# Patient Record
Sex: FEMALE | Race: WHITE | NOT HISPANIC OR LATINO | Employment: FULL TIME | URBAN - METROPOLITAN AREA
[De-identification: names, ages, dates, MRNs, and addresses within clinical notes are randomized per-mention and may not be internally consistent; named-entity substitution may affect disease eponyms.]

---

## 2019-02-20 ENCOUNTER — HOSPITAL ENCOUNTER (OUTPATIENT)
Dept: MAMMOGRAPHY | Facility: HOSPITAL | Age: 39
Discharge: HOME | End: 2019-02-20
Attending: OBSTETRICS & GYNECOLOGY | Admitting: OBSTETRICS & GYNECOLOGY

## 2019-12-05 ENCOUNTER — OFFICE VISIT (OUTPATIENT)
Dept: ORTHOPEDIC SURGERY | Facility: CLINIC | Age: 39
End: 2019-12-05

## 2019-12-05 VITALS
HEART RATE: 71 BPM | HEIGHT: 64 IN | WEIGHT: 114 LBS | DIASTOLIC BLOOD PRESSURE: 72 MMHG | BODY MASS INDEX: 19.46 KG/M2 | SYSTOLIC BLOOD PRESSURE: 107 MMHG

## 2019-12-05 DIAGNOSIS — M79.645 FINGER PAIN, LEFT: Primary | ICD-10-CM

## 2019-12-05 DIAGNOSIS — R22.32 SUBCUTANEOUS MASS OF FINGER OF LEFT HAND: ICD-10-CM

## 2019-12-05 PROBLEM — K21.9 GASTROESOPHAGEAL REFLUX DISEASE: Status: ACTIVE | Noted: 2019-12-05

## 2019-12-05 PROBLEM — J30.9 ALLERGIC RHINITIS: Status: ACTIVE | Noted: 2019-12-05

## 2019-12-05 PROCEDURE — 99243 OFF/OP CNSLTJ NEW/EST LOW 30: CPT | Performed by: ORTHOPAEDIC SURGERY

## 2019-12-05 NOTE — PROGRESS NOTES
"     Patient ID: Deja Ibarra is a 39 y.o. female.    Chief Complaint:    Chief Complaint   Patient presents with   • Consult     cyst on base of middle finger       HPI:  Deja is a 39-year-old female here with a couple months of a mass on the volar aspect of the left hand.  It is waxed and waned in size, she felt like it might of gone away for a while but recurred.  It is tender only when she hits it on an object.  No constitutional symptoms.  No limitation of digit range of motion.  Pain is dull and a 1/10.  Here in consultation from Dr. Silver  History reviewed. No pertinent past medical history.    Past Surgical History:   Procedure Laterality Date   • BREAST SURGERY      implants   • HYSTERECTOMY         Family History   Problem Relation Age of Onset   • Hypertension Father    • Malig Hypertension Father           Social History     Occupational History   • Not on file   Tobacco Use   • Smoking status: Never Smoker   Substance and Sexual Activity   • Alcohol use: Yes   • Drug use: Not on file   • Sexual activity: Not on file      Review of Systems   Cardiovascular: Negative for chest pain.   Musculoskeletal: Positive for arthralgias.       Objective:    /72   Pulse 71   Ht 162.6 cm (64\")   Wt 51.7 kg (114 lb)   BMI 19.57 kg/m²     Physical Examination:  She is a pleasant female in no distress. She is alert and oriented x3 and appears her stated age.  Left hand demonstrates a small approximately 5 mm somewhat firm nodule over the volar aspect of the long finger at the base of the proximal phalanx.  It is freely mobile and appears subcutaneous.  It does not move with finger flexion and extension.  She has full range of motion of the digit.  Sensory and motor exam are intact all distributions. 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  This point she would like to continue observation.  See me as needed          Disclaimer: Please note that areas of this note were completed with computer voice recognition software.  Quite often unanticipated grammatical, syntax, homophones, and other interpretive errors are inadvertently transcribed by the computer software. Please excuse any errors that have escaped final proofreading.

## 2020-02-24 ENCOUNTER — TRANSCRIBE ORDERS (OUTPATIENT)
Dept: ADMINISTRATIVE | Facility: HOSPITAL | Age: 40
End: 2020-02-24

## 2020-02-24 DIAGNOSIS — Z12.31 BREAST CANCER SCREENING BY MAMMOGRAM: Primary | ICD-10-CM

## 2020-02-26 ENCOUNTER — HOSPITAL ENCOUNTER (OUTPATIENT)
Dept: MAMMOGRAPHY | Facility: HOSPITAL | Age: 40
Discharge: HOME | End: 2020-02-26
Admitting: OBSTETRICS & GYNECOLOGY

## 2020-02-26 DIAGNOSIS — Z12.31 BREAST CANCER SCREENING BY MAMMOGRAM: ICD-10-CM

## 2020-02-26 PROCEDURE — 77067 SCR MAMMO BI INCL CAD: CPT

## 2020-02-26 PROCEDURE — 77063 BREAST TOMOSYNTHESIS BI: CPT

## 2020-12-28 ENCOUNTER — TELEMEDICINE (OUTPATIENT)
Dept: FAMILY MEDICINE CLINIC | Facility: CLINIC | Age: 40
End: 2020-12-28

## 2020-12-28 DIAGNOSIS — K21.9 GASTROESOPHAGEAL REFLUX DISEASE WITHOUT ESOPHAGITIS: Primary | ICD-10-CM

## 2020-12-28 PROCEDURE — 99213 OFFICE O/P EST LOW 20 MIN: CPT | Performed by: FAMILY MEDICINE

## 2020-12-28 RX ORDER — PANTOPRAZOLE SODIUM 40 MG/1
40 TABLET, DELAYED RELEASE ORAL DAILY
Qty: 90 TABLET | Refills: 1 | Status: SHIPPED | OUTPATIENT
Start: 2020-12-28 | End: 2021-03-19

## 2020-12-28 NOTE — PROGRESS NOTES
Jadon Ibarra is a 40 y.o. female.     Patient was seen today through synchronous audio/video technology during the COVID-19 Public Health Emergency. Verbal consent was obtained. The patient was located at home. Vitals signs were not obtained due to lack of home monitoring access. Time spent with patient was 10 minutes.  Patient complains of intermittent heartburn that has been going on for several months not associated with any particular activity or dietary changes.  She has tried Pepcid, antacids, and Tums without improvement.  She did take a family members pantoprazole for 2 to 3 days with significant improvement in symptoms.  She does not use tobacco or alcohol.  She is quite active physically without any change in exercise tolerance.        {Common H&P Review Areas:22292}    Review of Systems   Constitutional: Negative for chills, fatigue and fever.   Respiratory: Negative for cough and shortness of breath.    Cardiovascular: Negative for chest pain and palpitations.   Gastrointestinal: Negative for constipation, diarrhea, nausea and vomiting.   Musculoskeletal: Negative for back pain and neck pain.   Skin: Negative for rash.   Neurological: Negative for dizziness and headaches.       Objective   Physical Exam  Constitutional:       General: She is not in acute distress.     Appearance: She is well-developed.   HENT:      Right Ear: Hearing normal.      Left Ear: Hearing normal.   Eyes:      General: Lids are normal. No scleral icterus.        Right eye: No discharge.         Left eye: No discharge.      Conjunctiva/sclera: Conjunctivae normal.      Pupils: Pupils are equal, round, and reactive to light.   Pulmonary:      Effort: Pulmonary effort is normal. No respiratory distress.   Skin:     Coloration: Skin is not pale.      Findings: No rash.   Neurological:      Mental Status: She is alert and oriented to person, place, and time.   Psychiatric:         Speech: Speech normal.          Behavior: Behavior normal.         Thought Content: Thought content normal.         Judgment: Judgment normal.         Assessment/Plan   Diagnoses and all orders for this visit:    1. Gastroesophageal reflux disease without esophagitis (Primary)  Assessment & Plan:  Since she has responded to pantoprazole, will try this.  She does not smoke or use alcohol.  Limit citrus fruits, large portions, recumbency after meals, and caffeine as much as possible.  Report any worsening symptoms or lack of improvement.      Other orders  -     pantoprazole (PROTONIX) 40 MG EC tablet; Take 1 tablet by mouth Daily.  Dispense: 90 tablet; Refill: 1

## 2020-12-28 NOTE — ASSESSMENT & PLAN NOTE
Since she has responded to pantoprazole, will try this.  She does not smoke or use alcohol.  Limit citrus fruits, large portions, recumbency after meals, and caffeine as much as possible.  Report any worsening symptoms or lack of improvement.

## 2020-12-28 NOTE — PROGRESS NOTES
Jadon Ibarra is a 40 y.o. female.   Patient was seen today through synchronous audio/video technology during the COVID-19 Public [facility] Emergency. Verbal consent was obtained. The patient was located at home. Vitals signs were not obtained due to lack of home monitoring access. Time spent with patient was 10 minutes.   Patient complains of intermittent heartburn that has been going on for several months not associated with any particular activity or dietary changes. She has tried Pepcid, antacids, and Tums without improvement. She did take a family members pantoprazole for 2 to 3 days with significant improvement in symptoms. She does not use tobacco or alcohol. She is quite active physically without any change in exercise tolerance.     The following portions of the patient's history were reviewed and updated as appropriate: allergies, current medications, past family history, past medical history, past social history, past surgical history and problem list.   Review of Systems   Constitutional: Negative for chills, fatigue and fever.   Respiratory: Negative for cough and shortness of breath.   Cardiovascular: Negative for chest pain and palpitations.   Gastrointestinal: Negative for constipation, diarrhea, nausea and vomiting.   Musculoskeletal: Negative for back pain and neck pain.   Skin: Negative for rash.   Neurological: Negative for dizziness and headaches.     Objective   Physical Exam   Constitutional:   General: She is not in acute distress.  Appearance: She is well-developed.   HENT:   Right Ear: Hearing normal.   Left Ear: Hearing normal.   Eyes:   General: Lids are normal. No scleral icterus.   Right eye: No discharge.   Left eye: No discharge.   Conjunctiva/sclera: Conjunctivae normal.   Pupils: Pupils are equal, round, and reactive to light.   Pulmonary:   Effort: Pulmonary effort is normal. No respiratory distress.   Skin:   Coloration: Skin is not pale.   Findings: No rash.    Neurological:   Mental Status: She is alert and oriented to person, place, and time.   Psychiatric:   Speech: Speech normal.   Behavior: Behavior normal.   Thought Content: Thought content normal.   Judgment: Judgment normal.     Assessment/Plan   Diagnoses and all orders for this visit:   1. Gastroesophageal reflux disease without esophagitis (Primary)   Assessment & Plan:   Since she has responded to pantoprazole, will try this. She does not smoke or use alcohol. Limit citrus fruits, large portions, recumbency after meals, and caffeine as much as possible. Report any worsening symptoms or lack of improvement.   Other orders   - pantoprazole (PROTONIX) 40 MG EC tablet; Take 1 tablet by mouth Daily. Dispense: 90 tablet; Refill: 1

## 2021-03-19 ENCOUNTER — OFFICE VISIT (OUTPATIENT)
Dept: FAMILY MEDICINE CLINIC | Facility: CLINIC | Age: 41
End: 2021-03-19

## 2021-03-19 VITALS
RESPIRATION RATE: 16 BRPM | HEART RATE: 66 BPM | TEMPERATURE: 97.7 F | OXYGEN SATURATION: 100 % | SYSTOLIC BLOOD PRESSURE: 110 MMHG | HEIGHT: 64 IN | BODY MASS INDEX: 19.87 KG/M2 | DIASTOLIC BLOOD PRESSURE: 76 MMHG | WEIGHT: 116.4 LBS

## 2021-03-19 DIAGNOSIS — Z23 NEED FOR VACCINATION: ICD-10-CM

## 2021-03-19 DIAGNOSIS — Z13.220 SCREENING FOR LIPID DISORDERS: ICD-10-CM

## 2021-03-19 DIAGNOSIS — R53.83 FATIGUE, UNSPECIFIED TYPE: ICD-10-CM

## 2021-03-19 DIAGNOSIS — K21.9 GASTROESOPHAGEAL REFLUX DISEASE WITHOUT ESOPHAGITIS: ICD-10-CM

## 2021-03-19 DIAGNOSIS — Z00.00 PREVENTATIVE HEALTH CARE: Primary | ICD-10-CM

## 2021-03-19 DIAGNOSIS — Z12.31 BREAST CANCER SCREENING BY MAMMOGRAM: ICD-10-CM

## 2021-03-19 PROBLEM — H91.91 HEARING LOSS OF RIGHT EAR: Status: ACTIVE | Noted: 2021-03-19

## 2021-03-19 PROCEDURE — 99396 PREV VISIT EST AGE 40-64: CPT | Performed by: NURSE PRACTITIONER

## 2021-03-19 PROCEDURE — 90715 TDAP VACCINE 7 YRS/> IM: CPT | Performed by: NURSE PRACTITIONER

## 2021-03-19 PROCEDURE — 90471 IMMUNIZATION ADMIN: CPT | Performed by: NURSE PRACTITIONER

## 2021-03-19 RX ORDER — FAMOTIDINE 40 MG/1
40 TABLET, FILM COATED ORAL 2 TIMES DAILY
Qty: 180 TABLET | Refills: 3 | Status: SHIPPED | OUTPATIENT
Start: 2021-03-19 | End: 2022-08-12

## 2021-03-19 NOTE — PROGRESS NOTES
Chief Complaint  Annual Exam    Subjective          History of Present Illness  Patient presents for her annual wellness/preventive visit.  She feels well overall.  She has a few minor complaints.  She has experienced increasing fatigue over the last year.  She also has noticed more general hair loss from her head.  She alternates constipation and diarrhea, which has been her pattern for years.  She has occasional night sweats.  She exercises regularly and generally eats a healthy diet.  She has been taking Protonix for acid reflux and this has kept her symptoms controlled.  She was diagnosed with hearing loss in the right ear and is now wearing a hearing aid.  She sees gynecology every 5 years for pelvic examinations.        Current Outpatient Medications:   •  famotidine (Pepcid) 40 MG tablet, Take 1 tablet by mouth 2 (Two) Times a Day., Disp: 180 tablet, Rfl: 3     Review of Systems   Constitutional: Positive for fatigue. Negative for appetite change, chills, diaphoresis, fever, unexpected weight gain and unexpected weight loss.   HENT: Positive for hearing loss. Negative for congestion, ear discharge, ear pain, mouth sores, nosebleeds, postnasal drip, rhinorrhea, sinus pressure, sneezing, sore throat, swollen glands and trouble swallowing.    Eyes: Negative for blurred vision, double vision, pain, discharge, redness and itching.   Respiratory: Negative for apnea, cough, choking, shortness of breath, wheezing and stridor.    Cardiovascular: Negative for chest pain, palpitations and leg swelling.   Gastrointestinal: Negative for abdominal distention, abdominal pain, anal bleeding, blood in stool, constipation, diarrhea, nausea, rectal pain, vomiting and indigestion.        Alternates constipation/diarrhea   Endocrine: Negative for cold intolerance, heat intolerance, polydipsia, polyphagia and polyuria.        Night sweats   Genitourinary: Negative for breast discharge, breast lump, breast pain, difficulty  "urinating, dysuria, flank pain, frequency, genital sores, hematuria, pelvic pain, urgency, urinary incontinence, vaginal bleeding, vaginal discharge and vaginal pain.   Musculoskeletal: Negative for arthralgias, back pain, gait problem, joint swelling, myalgias, neck pain and neck stiffness.        Increased hair loss   Skin: Negative for color change, rash and skin lesions.   Neurological: Negative for dizziness, tremors, seizures, syncope, speech difficulty, weakness, light-headedness, numbness, headache, memory problem and confusion.   Hematological: Negative for adenopathy. Does not bruise/bleed easily.   Psychiatric/Behavioral: Negative for self-injury, sleep disturbance, suicidal ideas, depressed mood and stress. The patient is not nervous/anxious.         Objective   Vital Signs:   Vitals:    03/19/21 1056   BP: 110/76   BP Location: Right arm   Patient Position: Sitting   Cuff Size: Adult   Pulse: 66   Resp: 16   Temp: 97.7 °F (36.5 °C)   TempSrc: Temporal   SpO2: 100%   Weight: 52.8 kg (116 lb 6.4 oz)   Height: 162.6 cm (64\")     Body mass index is 19.98 kg/m².      PHQ-2 Depression Screening  Little interest or pleasure in doing things? 0   Feeling down, depressed, or hopeless? 0   PHQ-2 Total Score 0        Physical Exam  Vitals reviewed.   Constitutional:       General: She is not in acute distress.     Appearance: She is well-developed.   HENT:      Head: Normocephalic and atraumatic.      Right Ear: Ear canal and external ear normal. Tympanic membrane is scarred. Tympanic membrane is not injected, erythematous, retracted or bulging.      Left Ear: Tympanic membrane, ear canal and external ear normal. Tympanic membrane is not injected, erythematous, retracted or bulging.      Nose: Nose normal. No mucosal edema or rhinorrhea.      Right Sinus: No maxillary sinus tenderness or frontal sinus tenderness.      Left Sinus: No maxillary sinus tenderness or frontal sinus tenderness.      Mouth/Throat:      " Mouth: No oral lesions.      Pharynx: Uvula midline. No oropharyngeal exudate or posterior oropharyngeal erythema.   Eyes:      General: Lids are normal.         Right eye: No discharge.         Left eye: No discharge.      Conjunctiva/sclera: Conjunctivae normal.      Pupils: Pupils are equal, round, and reactive to light.   Neck:      Thyroid: No thyroid mass or thyromegaly.      Vascular: No carotid bruit or JVD.      Trachea: No tracheal deviation.   Cardiovascular:      Rate and Rhythm: Normal rate and regular rhythm.      Heart sounds: Normal heart sounds. No murmur heard.   No friction rub. No gallop.    Pulmonary:      Effort: Pulmonary effort is normal. No respiratory distress.      Breath sounds: Normal breath sounds. No wheezing or rales.   Chest:      Breasts: Breasts are symmetrical.         Right: No inverted nipple, mass, nipple discharge, skin change or tenderness.         Left: No inverted nipple, mass, nipple discharge, skin change or tenderness.      Comments: Palpable bilateral implants    Abdominal:      General: Bowel sounds are normal. There is no distension.      Palpations: Abdomen is soft. There is no mass.      Tenderness: There is no abdominal tenderness.      Hernia: No hernia is present.   Genitourinary:     Comments: Patient declined examination  Musculoskeletal:         General: No deformity. Normal range of motion.      Cervical back: Normal range of motion and neck supple.   Lymphadenopathy:      Cervical: No cervical adenopathy.   Skin:     General: Skin is warm and dry.      Findings: No lesion or rash.   Neurological:      Mental Status: She is alert and oriented to person, place, and time.      Cranial Nerves: No cranial nerve deficit.      Sensory: No sensory deficit.      Gait: Gait normal.      Deep Tendon Reflexes: Reflexes are normal and symmetric.   Psychiatric:         Speech: Speech normal.         Behavior: Behavior normal.         Thought Content: Thought content  normal.         Judgment: Judgment normal.          Assessment and Plan    Diagnoses and all orders for this visit:    1. Preventative health care (Primary)  Comments:  Reviewed age-appropriate health maintenance.    2. Gastroesophageal reflux disease without esophagitis  Assessment & Plan:  Discussed risks of prolonged PPI use.  We will try to switch to H2RA.  She will let me know if changes or not controlled on Pepcid.    Orders:  -     famotidine (Pepcid) 40 MG tablet; Take 1 tablet by mouth 2 (Two) Times a Day.  Dispense: 180 tablet; Refill: 3    3. Fatigue, unspecified type  Comments:  Likely multifactorial.  Orders:  -     TSH  -     CBC & Differential  -     Comprehensive Metabolic Panel    4. Screening for lipid disorders  -     Lipid Panel    5. Breast cancer screening by mammogram  -     Mammo Screening Digital Tomosynthesis Bilateral With CAD; Future    6. Need for vaccination  -     Tdap Vaccine Greater Than or Equal To 8yo IM    Other orders  -     Cancel: POC Urinalysis Dipstick, Automated  -     Cancel: Hepatitis C Antibody          Follow Up   Return in about 1 year (around 3/19/2022) for Annual physical.    Patient was given instructions and counseling regarding her condition and health maintenance advice. Please see specific information in the After Visit Summary.     Ksenia Silver, YESI 3/19/2021 16:08 EDT  This note was electronically signed.

## 2021-03-19 NOTE — ASSESSMENT & PLAN NOTE
Discussed risks of prolonged PPI use.  We will try to switch to H2RA.  She will let me know if changes or not controlled on Pepcid.

## 2021-03-20 LAB
ALBUMIN SERPL-MCNC: 4.8 G/DL (ref 3.8–4.8)
ALBUMIN/GLOB SERPL: 2.1 {RATIO} (ref 1.2–2.2)
ALP SERPL-CCNC: 56 IU/L (ref 39–117)
ALT SERPL-CCNC: 10 IU/L (ref 0–32)
AST SERPL-CCNC: 22 IU/L (ref 0–40)
BASOPHILS # BLD AUTO: 0 X10E3/UL (ref 0–0.2)
BASOPHILS NFR BLD AUTO: 1 %
BILIRUB SERPL-MCNC: 1 MG/DL (ref 0–1.2)
BUN SERPL-MCNC: 17 MG/DL (ref 6–24)
BUN/CREAT SERPL: 20 (ref 9–23)
CALCIUM SERPL-MCNC: 9.1 MG/DL (ref 8.7–10.2)
CHLORIDE SERPL-SCNC: 107 MMOL/L (ref 96–106)
CHOLEST SERPL-MCNC: 160 MG/DL (ref 100–199)
CO2 SERPL-SCNC: 20 MMOL/L (ref 20–29)
CREAT SERPL-MCNC: 0.83 MG/DL (ref 0.57–1)
EOSINOPHIL # BLD AUTO: 0 X10E3/UL (ref 0–0.4)
EOSINOPHIL NFR BLD AUTO: 1 %
ERYTHROCYTE [DISTWIDTH] IN BLOOD BY AUTOMATED COUNT: 12.8 % (ref 11.7–15.4)
GLOBULIN SER CALC-MCNC: 2.3 G/DL (ref 1.5–4.5)
GLUCOSE SERPL-MCNC: 84 MG/DL (ref 65–99)
HCT VFR BLD AUTO: 38.4 % (ref 34–46.6)
HDLC SERPL-MCNC: 57 MG/DL
HGB BLD-MCNC: 12.9 G/DL (ref 11.1–15.9)
IMM GRANULOCYTES # BLD AUTO: 0 X10E3/UL (ref 0–0.1)
IMM GRANULOCYTES NFR BLD AUTO: 0 %
LDLC SERPL CALC-MCNC: 90 MG/DL (ref 0–99)
LYMPHOCYTES # BLD AUTO: 1.7 X10E3/UL (ref 0.7–3.1)
LYMPHOCYTES NFR BLD AUTO: 35 %
MCH RBC QN AUTO: 30.9 PG (ref 26.6–33)
MCHC RBC AUTO-ENTMCNC: 33.6 G/DL (ref 31.5–35.7)
MCV RBC AUTO: 92 FL (ref 79–97)
MONOCYTES # BLD AUTO: 0.4 X10E3/UL (ref 0.1–0.9)
MONOCYTES NFR BLD AUTO: 9 %
NEUTROPHILS # BLD AUTO: 2.7 X10E3/UL (ref 1.4–7)
NEUTROPHILS NFR BLD AUTO: 54 %
PLATELET # BLD AUTO: 233 X10E3/UL (ref 150–450)
POTASSIUM SERPL-SCNC: 4.2 MMOL/L (ref 3.5–5.2)
PROT SERPL-MCNC: 7.1 G/DL (ref 6–8.5)
RBC # BLD AUTO: 4.17 X10E6/UL (ref 3.77–5.28)
SODIUM SERPL-SCNC: 141 MMOL/L (ref 134–144)
TRIGL SERPL-MCNC: 64 MG/DL (ref 0–149)
TSH SERPL DL<=0.005 MIU/L-ACNC: 0.93 UIU/ML (ref 0.45–4.5)
VLDLC SERPL CALC-MCNC: 13 MG/DL (ref 5–40)
WBC # BLD AUTO: 4.9 X10E3/UL (ref 3.4–10.8)

## 2021-03-24 ENCOUNTER — APPOINTMENT (OUTPATIENT)
Dept: MAMMOGRAPHY | Facility: HOSPITAL | Age: 41
End: 2021-03-24

## 2021-03-29 ENCOUNTER — HOSPITAL ENCOUNTER (OUTPATIENT)
Dept: MAMMOGRAPHY | Facility: HOSPITAL | Age: 41
Discharge: HOME | End: 2021-03-29
Admitting: NURSE PRACTITIONER

## 2021-03-29 DIAGNOSIS — Z12.31 BREAST CANCER SCREENING BY MAMMOGRAM: ICD-10-CM

## 2021-03-29 PROCEDURE — 77067 SCR MAMMO BI INCL CAD: CPT

## 2021-03-29 PROCEDURE — 77063 BREAST TOMOSYNTHESIS BI: CPT

## 2021-09-17 ENCOUNTER — E-VISIT (OUTPATIENT)
Dept: FAMILY MEDICINE CLINIC | Facility: TELEHEALTH | Age: 41
End: 2021-09-17

## 2021-09-17 DIAGNOSIS — R39.89 SUSPECTED UTI: Primary | ICD-10-CM

## 2021-09-17 PROCEDURE — 99422 OL DIG E/M SVC 11-20 MIN: CPT | Performed by: NURSE PRACTITIONER

## 2021-09-17 RX ORDER — PHENAZOPYRIDINE HYDROCHLORIDE 200 MG/1
200 TABLET, FILM COATED ORAL 3 TIMES DAILY PRN
Qty: 6 TABLET | Refills: 0 | Status: SHIPPED | OUTPATIENT
Start: 2021-09-17 | End: 2021-09-19

## 2021-09-17 RX ORDER — NITROFURANTOIN 25; 75 MG/1; MG/1
100 CAPSULE ORAL 2 TIMES DAILY
Qty: 14 CAPSULE | Refills: 0 | Status: SHIPPED | OUTPATIENT
Start: 2021-09-17 | End: 2021-09-24

## 2021-09-17 NOTE — PROGRESS NOTES
Deja Ibarra    1980  [id number]    I have reviewed the e-Visit questionnaire and patient's answers, my assessment and plan are as follows:      HPI  Deja Ibarra is a 41 y.o. with a 1-4 day history of dysuria described as burning, frequency and urgency.  She denies fever/chills, nausea/vomiting, belly/back pain, hematuria, or vagina symptoms.  She has had similar symptoms in the past which resolved with antibiotic treatment for UTI.      Review of Systems - Negative except symptoms listed in HPI      Diagnoses and all orders for this visit:    1. Suspected UTI (Primary)  -     nitrofurantoin, macrocrystal-monohydrate, (MACROBID) 100 MG capsule; Take 1 capsule by mouth 2 (Two) Times a Day for 7 days.  Dispense: 14 capsule; Refill: 0  -     phenazopyridine (PYRIDIUM) 200 MG tablet; Take 1 tablet by mouth 3 (Three) Times a Day As Needed for Bladder Spasms for up to 2 days.  Dispense: 6 tablet; Refill: 0    -Macrobid as prescribed - complete entire course of medication even if you begin to feel better.   --Phenazopyridine is for painful urination and bladder spasms--this medication with cause urine to become bright orange and can stain undergarments.    -Continue to increase your fluid intake.   -Abstain from intercourse during antibiotic treatment.   -Practice good perineal hygiene: wipe front to back  -Do not hold your urine- go to the bathroom every 2-3 hours.     -Warning signs: severe abdominal/pelvic/back pain, fever >101, blood in urine - seek medical attention as soon as possible for a hands on/objective exam and possible labs.     -Follow up with your PCP in 2 days if no improvement in symptoms or if symptoms begin to worsen.       Any medications prescribed have been sent electronically to   [id number] - [place], [place] - [address] - [phone]  - [phone] FX  [address]  [place] [address]  Phone: [phone] Fax: [phone]    [facility] -  [place]  [address] [place] [address] [place] [address]  Phone: [phone] Fax: [phone]      Time Documentation  Counseled patient  Counseling topics: diagnosis, treatment options and return instructions  Total encounter time: counseling time more than 50% of visit: 20 minutes        Belem Garg, YESI  09/17/21  09:39 EDT

## 2021-09-17 NOTE — PATIENT INSTRUCTIONS
Urinary Tract Infection, Adult    A urinary tract infection (UTI) is an infection of any part of the urinary tract. The urinary tract includes the kidneys, ureters, bladder, and urethra. These organs make, store, and get rid of urine in the body.  Your health care provider may use other names to describe the infection. An upper UTI affects the ureters and kidneys (pyelonephritis). A lower UTI affects the bladder (cystitis) and urethra (urethritis).  What are the causes?  Most urinary tract infections are caused by bacteria in your genital area, around the entrance to your urinary tract (urethra). These bacteria grow and cause inflammation of your urinary tract.  What increases the risk?  You are more likely to develop this condition if:  · You have a urinary catheter that stays in place (indwelling).  · You are not able to control when you urinate or have a bowel movement (you have incontinence).  · You are female and you:  ? Use a spermicide or diaphragm for birth control.  ? Have low estrogen levels.  ? Are pregnant.  · You have certain genes that increase your risk (genetics).  · You are sexually active.  · You take antibiotic medicines.  · You have a condition that causes your flow of urine to slow down, such as:  ? An enlarged prostate, if you are male.  ? Blockage in your urethra (stricture).  ? A kidney stone.  ? A nerve condition that affects your bladder control (neurogenic bladder).  ? Not getting enough to drink, or not urinating often.  · You have certain medical conditions, such as:  ? Diabetes.  ? A weak disease-fighting system (immunesystem).  ? Sickle cell disease.  ? Gout.  ? Spinal cord injury.  What are the signs or symptoms?  Symptoms of this condition include:  · Needing to urinate right away (urgently).  · Frequent urination or passing small amounts of urine frequently.  · Pain or burning with urination.  · Blood in the urine.  · Urine that smells bad or unusual.  · Trouble urinating.  · Cloudy  urine.  · Vaginal discharge, if you are female.  · Pain in the abdomen or the lower back.  You may also have:  · Vomiting or a decreased appetite.  · Confusion.  · Irritability or tiredness.  · A fever.  · Diarrhea.  The first symptom in older adults may be confusion. In some cases, they may not have any symptoms until the infection has worsened.  How is this diagnosed?  This condition is diagnosed based on your medical history and a physical exam. You may also have other tests, including:  · Urine tests.  · Blood tests.  · Tests for sexually transmitted infections (STIs).  If you have had more than one UTI, a cystoscopy or imaging studies may be done to determine the cause of the infections.  How is this treated?  Treatment for this condition includes:  · Antibiotic medicine.  · Over-the-counter medicines to treat discomfort.  · Drinking enough water to stay hydrated.  If you have frequent infections or have other conditions such as a kidney stone, you may need to see a health care provider who specializes in the urinary tract (urologist).  In rare cases, urinary tract infections can cause sepsis. Sepsis is a life-threatening condition that occurs when the body responds to an infection. Sepsis is treated in the hospital with IV antibiotics, fluids, and other medicines.  Follow these instructions at home:    Medicines  · Take over-the-counter and prescription medicines only as told by your health care provider.  · If you were prescribed an antibiotic medicine, take it as told by your health care provider. Do not stop using the antibiotic even if you start to feel better.  General instructions  · Make sure you:  ? Empty your bladder often and completely. Do not hold urine for long periods of time.  ? Empty your bladder after sex.  ? Wipe from front to back after a bowel movement if you are female. Use each tissue one time when you wipe.  · Drink enough fluid to keep your urine pale yellow.  · Keep all follow-up  visits as told by your health care provider. This is important.  Contact a health care provider if:  · Your symptoms do not get better after 1-2 days.  · Your symptoms go away and then return.  Get help right away if you have:  · Severe pain in your back or your lower abdomen.  · A fever.  · Nausea or vomiting.  Summary  · A urinary tract infection (UTI) is an infection of any part of the urinary tract, which includes the kidneys, ureters, bladder, and urethra.  · Most urinary tract infections are caused by bacteria in your genital area, around the entrance to your urinary tract (urethra).  · Treatment for this condition often includes antibiotic medicines.  · If you were prescribed an antibiotic medicine, take it as told by your health care provider. Do not stop using the antibiotic even if you start to feel better.  · Keep all follow-up visits as told by your health care provider. This is important.  This information is not intended to replace advice given to you by your health care provider. Make sure you discuss any questions you have with your health care provider.  Document Revised: 12/05/2019 Document Reviewed: 06/27/2019  [employer] Patient Education © 2021 [employer]

## 2022-03-08 ENCOUNTER — TRANSCRIBE ORDERS (OUTPATIENT)
Dept: ADMINISTRATIVE | Facility: HOSPITAL | Age: 42
End: 2022-03-08

## 2022-03-08 DIAGNOSIS — Z12.31 VISIT FOR SCREENING MAMMOGRAM: Primary | ICD-10-CM

## 2022-04-04 ENCOUNTER — HOSPITAL ENCOUNTER (OUTPATIENT)
Dept: MAMMOGRAPHY | Facility: HOSPITAL | Age: 42
Discharge: HOME | End: 2022-04-04
Admitting: NURSE PRACTITIONER

## 2022-04-04 DIAGNOSIS — Z12.31 VISIT FOR SCREENING MAMMOGRAM: ICD-10-CM

## 2022-04-04 PROCEDURE — 77063 BREAST TOMOSYNTHESIS BI: CPT

## 2022-04-04 PROCEDURE — 77067 SCR MAMMO BI INCL CAD: CPT

## 2022-04-22 ENCOUNTER — E-VISIT (OUTPATIENT)
Dept: FAMILY MEDICINE CLINIC | Facility: TELEHEALTH | Age: 42
End: 2022-04-22

## 2022-04-22 PROCEDURE — BRIGHTMDVISIT: Performed by: NURSE PRACTITIONER

## 2022-04-22 NOTE — E-VISIT TREATED
Chief Complaint: Coronavirus (COVID-19), cold, sinus pain, allergy, or flu   Patient introduction   Patient is 42-year-old female who reports congestion, rhinitis, and headache that started < 48 hours ago.   Patient has not requested COVID testing.   COVID-19 exposure, testing history, and vaccination status:    No known exposure to a confirmed or suspected case of COVID-19.    No recent travel outside of their local community.    Patient had a self-test on 4/21/2022. Test result was negative.    Reports receiving 3 doses of the COVID-19 vaccine (Pfizer, Pfizer, Pfizer). Received their most recent dose of the vaccine > 14 days ago.   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   General presentation   Symptoms came on gradually.   Fever:    Denies fever.   Sinus and nasal symptoms:    Reports rhinitis.    Reports clear nasal drainage.    Nasal drainage is thin.    Reports postnasal drip.    Reports congestion with sinus pain or pressure on or around their forehead, eyes, and nose.    Patient first noticed sinus pain < 5 days ago.    Sinus pain is worse with Valsalva.    Denies itchy nose or sneezing.    Denies history of unhealed nasal septal ulcer/nasal wound.    Denies antibiotic treatment for sinus infection in the last year.    Denies history of deviated septum or nasal polyps.   Sore throat:    Denies sore throat.   Head and body aches:    Reports headache, described as mild (1-3 on a scale of 1-10).    Denies sweats.    Denies chills.    Denies myalgia.    Denies fatigue.   Cough:    Denies cough.   Wheezing and SOB:    Denies COPD diagnosis.    Denies asthma diagnosis.    Denies wheezing.    Denies shortness of breath.    Denies previous albuterol inhaler use during URIs, bronchitis, or pneumonia.    Denies previous steroid inhaler use during URIs, bronchitis, or pneumonia.   Chest pain:    Denies chest pain.   Dizziness:    Denies  dizziness.   Allergies:    Denies history of allergies.   Flu exposure:    Denies recent exposure to confirmed flu diagnosis.    Reports a flu vaccine more than 6 months ago.   Patient denies the following red flags:    Changes in alertness or awareness    Symptoms suggesting intracranial hemorrhage    Decreased urination   Pregnancy/menstrual status/breastfeeding:    Denies being pregnant    Denies breastfeeding    Regarding last menstrual period, patient writes: I've had a Hysterectomy x18 yrs ago   Self-exam:    No difficulty moving their chin toward their chest    Neck lymph nodes feel normal    Denies antibiotic treatment for similar symptoms within the past month   Current medications   Reports taking over-the-counter medication for current symptoms. Patient has taken ibuprofen and phenylephrine.   Denies taking other medications or supplements.   Medication allergies   None.   Medication contraindication review   Denies history of anaphylactic reaction to beta-lactam antibiotics; aspirin triad; blood dyscrasia; bone marrow depression; catecholamine-releasing paraganglioma; coronary artery disease; coagulation disorder; congenital long QT syndrome; depression; electrolyte abnormalities; fungal infection; GI bleeding; GI obstruction; G6PD deficiency; heart arrhythmia; hypertension; kidney disease or hemodialysis; mononucleosis; myasthenia; recent myocardial infarction; NSAID-induced asthma/urticaria; Parkinson's disease; pheochromocytoma; porphyria; Reye syndrome; seizure disorder; ulcerative colitis; and urinary retention.   Denies history of metoclopramide-associated dystonic reaction and tardive dyskinesia.   No known history of amoxicillin-clavulanate-associated cholestatic jaundice or hepatic impairment.   No known history of azithromycin-associated cholestatic jaundice or hepatic impairment.   Past medical history   Immune conditions: Denies immunocompromising conditions. Denies history of cancer.    Social history   Reports being a healthcare worker.   Non-smoker.   Assessment   Acute nasopharyngitis (common cold). Ruled out: Traumatic laryngitis.   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 No  Previous history of COVID-19 testing: Patient had a self-test on 4/21/2022. Test result was negative.     Plan   Medications:    guaifenesin 100 mg/5 mL syrup OTC 100mg/5mL 20mL PO q4h PRN 10d for cough or chest congestion. Brands to look for include Robitussin. Amount is 236 mL.    oxymetazoline 0.05 % nasal spray OTC 0.05% 2-3 sprays nasal bid PRN 3d for nasal congestion. Do not use more than 6 sprays per nostril in a 24-hour period. Do not use for more than 3 days. Brands to look for include Afrin. Amount is 15 mL.   No prescriptions were ordered to treat this patient. The patient selected the following pharmacy during their interview, but no prescriptions were sent:   [facility]   [address] [place] [address] [place] [facility]   Phone: [phone]     Fax: [phone]   Patient informed to purchase OTC medications.   Education:    Condition and causes    Prevention    Treatment and self-care    When to call provider   ----------   Electronically signed by YESI Jauregui on 2022-04-22 at 08:36AM   ----------   Patient Interview Transcript:   Why are you getting care through eVisit today? We can't guarantee a specific treatment or test. Your provider will decide what's best for you. Select all that apply.    None of the above   Not selected:    I want to know if I have a cold or something more serious    I want to know if I need to be seen by a provider    I need a doctor's note    I want to be tested for COVID-19    I want to get the COVID-19 vaccine    I think I'm having side effects  from the COVID-19 vaccine    I just want to feel better!   COVID-19 symptoms are similar to symptoms of other illnesses. This makes it difficult to diagnose. Please carefully consider each question and answer as best you can. This will help your provider make the right diagnosis. Which of these symptoms are bothering you? Select all that apply.    Stuffed-up nose or sinuses    Runny nose    Headache   Not selected:    Cough    Shortness of breath    Fever    Itchy or watery eyes    Itchy nose or sneezing    Loss of smell or taste    Sore throat    Hoarse voice or loss of voice    Sweats    Chills    Muscle or body aches    Fatigue or tiredness    Nausea or vomiting    Diarrhea    I don't have any of these symptoms   Do you have any of these conditions? These conditions can put you at increased risk for severe disease if you're infected with COVID-19. Select all that apply.    None of the above   Not selected:    Chronic lung disease, such as cystic fibrosis or interstitial fibrosis    Heart disease, such as congenital heart disease, congestive heart failure, or coronary artery disease    Disorder of the brain, spinal cord, or nerves and muscles, such as dementia, cerebral palsy, epilepsy, muscular dystrophy, or developmental delay    Metabolic disorder or mitochondrial disease    Cerebrovascular disease, such as stroke or another condition affecting the blood vessels or blood supply to the brain    Down syndrome    Mood disorder, including depression or schizophrenia spectrum disorders    Substance use disorder, such as alcohol, opioid, or cocaine use disorder    Tuberculosis   Do you live in a group care setting? Examples include: - Nursing home - Residential care - Psychiatric treatment facility - Group home - [facility] - Board and care home - Homeless shelter - Foster care setting Select one.    No   Not selected:    Yes   Have you ever been tested for COVID-19? Select one.    Yes   Not selected:    No   When was  your most recent COVID-19 test? Select one.    Within the last week (specify date in MM/DD/YYYY format): 4/21/2022   Not selected:    7 to 14 days ago    15 to 30 days ago    1 to 3 months ago    More than 3 months ago   What type of COVID-19 test did you most recently have? There are two types of COVID-19 tests: - Viral tests check if you're currently infected with COVID-19. For these tests, a nose swab or saliva sample is taken. Viral tests include self-tests and tests done at a doctor's office, lab, or testing site. - Antibody tests check if you've been infected in the past. For these tests, your blood is drawn. Antibody tests can only be done at a doctor's office, lab, or testing site. Select one.    Viral self-test   Not selected:    Viral test at a doctor's office, lab, or testing site    Antibody test   What was the result of your most recent COVID-19 test? Select one.    Negative   Not selected:    Positive    I'm not sure   Have you gotten the COVID-19 vaccine? Select one.    Yes   Not selected:    No   How many doses of the COVID-19 vaccine have you gotten? This includes boosters as well as third or fourth doses for those who are immunocompromised. Select one.    3 doses   Not selected:    1 dose    2 doses    4 doses   Which COVID-19 vaccine did you get for your first dose? Check your Vaccination Record Card under Product Name/. Select one.    Pfizer-BioNTech (Pfizer)   Not selected:    Burton & [employer]'s Elle Vaccine (J&J/Elle)    Moderna   Which COVID-19 vaccine did you get for your second dose? Check your Vaccination Record Card under Product Name/. Select one.    Pfizer-BioNTech (Pfizer)   Not selected:    Burton [employer] Burton's Elle Vaccine (J&J/Elle)    Moderna   Which COVID-19 vaccine did you get for your third dose? Check your Vaccination Record Card under Product Name/. Select one.    Pfizer-BioNTech (Pfizer)   Not selected:    Burton & Butron's  "Elle Vaccine (J&J/Elle)    Moderna   When did you get your most recent dose of the COVID-19 vaccine?    More than 14 days ago   Not selected:    Less than 48 hours (2 days) ago    48 to 72 hours (3 days) ago    3 to 5 days ago    5 to 7 days ago    7 to 14 days ago   In the last 14 days, have you traveled outside of your local community? This includes travel by car, RV, bus, train, or plane. Travel increases your chances of getting and spreading COVID-19. Select one.    No   Not selected:    Yes   In the last 14 days, have you had close contact with someone who has coronavirus (COVID-19)? \"Close contact\" means any of these: - Living in the same household as someone with COVID-19. - Caring for someone with COVID-19. - Being within 6 feet of someone with COVID-19 for a total of at least 15 minutes over a 24-hour period. For example, three 5-minute exposures for a total of 15 minutes. - Being in direct contact with respiratory droplets from someone with COVID-19 (being coughed on, kissing, sharing utensils). Select one.    No, not that I know of   Not selected:    Yes, a confirmed case    Yes, a suspected case   When did your current symptoms start? If you know the exact date your symptoms started, choose Other and enter the month and day. Select one.    Less than 48 hours ago   Not selected:    3 to 5 days ago    6 to 9 days ago    10 to 14 days ago    2 to 4 weeks ago    More than a month ago    Other (specify)   Did your symptoms come on suddenly or gradually? Select one.    Gradually   Not selected:    Suddenly    I'm not sure   You mentioned having a headache. On a scale of 1 to 10, how severe is your headache pain? Select one.    Mild (1 to 3)   Not selected:    Moderate (4 to 6)    Severe (7 to 9)    Unbearable (10)    The worst headache of my life (10+)   You mentioned having a stuffy nose or sinus congestion. Do you feel pain or pressure in your sinuses?    Yes   Not selected:    No    I'm not sure   " "Where do you feel sinus pain or pressure?    In my forehead    Around my eyes    Behind my nose   Not selected:    In my cheeks    In my upper teeth or jaw    I'm not sure   When did you first notice your sinus pain or pressure? Select one.    Less than 5 days ago   Not selected:    5 to 9 days ago    10 to 14 days ago    2 to 4 weeks ago    1 month ago or longer   Does coughing, sneezing, or leaning forward make your sinuses feel worse? Select one.    Yes   Not selected:    No    I'm not sure   What color is your nasal drainage? Select one.    Clear   Not selected:    White    Yellow    Green    My nose is stuffed but not draining or running    I'm not sure   Is your nasal drainage thick or thin? Select one.    Thin   Not selected:    Thick    I'm not sure   Is there any drainage (mucus) going down the back of your throat? This kind of drainage is also called \"postnasal drip.\" Select one.    Yes   Not selected:    No    I'm not sure   Since your symptoms started, have you felt dizzy? Select one.    No   Not selected:    Yes, but I can continue with my regular daily activities    Yes, and it makes it hard to stand, walk, or do daily activities   Do you have chest pain? You might also feel it as discomfort, aching, tightness, or squeezing in the chest. Select one.    No   Not selected:    Yes   Have you urinated at least 3 times in the last 24 hours? Select one.    Yes   Not selected:    No    I'm not sure   Changes in alertness or awareness may mean you need emergency care. Since your symptoms started, have you had any of these? Select all that apply.    None of the above   Not selected:    Confusion    Slurred speech    Not knowing where you are or what day it is    Difficulty staying conscious    Fainting or passing out   Do your symptoms include a whistling sound, or wheezing, when you breathe? Select one.    No   Not selected:    Yes    I'm not sure   Do you have any of these symptoms in your ear(s)? Select all " that apply.    None of the above   Not selected:    Pain    Pressure    Fullness    Crackling or popping    Plugged or blocked sensation   Can you move your chin toward your chest?    Yes   Not selected:    No, my neck is too stiff   Are your glands/lymph nodes swollen, or does it hurt when you touch them?    No   Not selected:    Yes    I'm not sure   People with a very high body mass index (BMI) are at higher risk for developing complications from the flu and severe illness from COVID-19. To determine your BMI, we need to know your weight and height. Please enter your weight (in pounds).    Weight   Please enter your height.    Height   In the past week, has anyone around you (such as at school, work, or home) had a confirmed diagnosis of the flu? A confirmed diagnosis means that a nose swab was done to verify a flu infection. Select all that apply.    No   Not selected:    I live with someone who has the flu    I've been within touching distance of someone who has the flu    I've walked by, or sat about 3 feet away from, someone who has the flu    I've been in the same building as someone who has the flu    I'm not sure   Have you ever been diagnosed with asthma? Select one.    No   Not selected:    Yes   Have you ever been prescribed albuterol to use for wheezing, cough, or shortness of breath caused by a cold, bronchitis, or pneumonia? Albuterol (ProAir, Proventil, Ventolin) is prescribed as an inhaler or a solution to be used with a nebulizer machine. Select one.    No   Not selected:    Yes    I'm not sure   Have you ever been prescribed a steroid inhaler to use for wheezing, cough, or shortness of breath caused by a cold, bronchitis, or pneumonia? Some examples of steroid inhalers include Pulmicort, Flovent, Qvar, and Alvesco. Select one.    No   Not selected:    Yes    I'm not sure   Have you ever been diagnosed with chronic obstructive pulmonary disease (COPD)? Select one.    No   Not selected:    Yes     I'm not sure   In the last year, how many times were you treated with antibiotics for a sinus infection? Select one.    None   Not selected:    1 to 3 times    4 or more times   Have you been diagnosed with a deviated septum or nasal polyps? The nose is divided into two nostrils by the septum. A crooked septum is called a deviated septum. Nasal polyps are growths inside the nose or sinuses. Select one.    No   Not selected:    Yes, but I had surgery to treat them    Yes, I have a deviated septum    Yes, I have nasal polyps    Yes, I have a deviated septum and nasal polyps    I'm not sure   Do you have a sore inside your nose that won't heal? Select one.    No   Not selected:    Yes    I'm not sure   Do you have allergies (pollen, dust mites, mold, animal dander)? Select one.    No   Not selected:    Yes    I'm not sure   Have you had a flu shot this season? Select one.    Yes, more than 6 months ago   Not selected:    Yes, less than 2 weeks ago    Yes, 2 to 4 weeks ago    Yes, 1 to 3 months ago    Yes, 3 to 6 months ago    I'm not sure    No   The flu and COVID-19 can be more serious for people with certain conditions or characteristics. These questions help us figure out if you or anyone you live with is at higher risk for complications from these infections. Do either of these statements apply to you? Select all that apply.    I'm a healthcare worker   Not selected:    I'm  or Native [place]    None of the above   Do you smoke tobacco? Select one.    No, never   Not selected:    Yes, every day    Yes, some days    No, I quit   Some conditions can put you at risk for more serious infections. Do any of these apply to you? Select all that apply.    None of the above   Not selected:    I've been hospitalized within the last 5 days    I have diabetes    I'm in close contact with a child in    Are you currently being treated for any of these conditions? Scroll to see all options. Select all that  apply.    None of the above   Not selected:    Aspirin triad (also known as Samter's triad or ASA triad)    Asthma or hives from taking aspirin or other NSAIDs, such as ibuprofen or naproxen    Blockage or narrowing of the blood vessels of the heart    Blood dyscrasia, such anemia, leukemia, lymphoma, or myeloma    Bone marrow depression    Catecholamine-releasing paraganglioma    Blood clotting disorder    Congenital long QT syndrome    Depression    Difficulty urinating or completely emptying your bladder    Uncorrected electrolyte abnormalities    Fungal infection    Gastrointestinal (GI) bleeding    Gastrointestinal (GI) obstruction    G6PD deficiency    Recent heart attack    High blood pressure    Irregular heartbeat or heart rhythm    Kidney disease or hemodialysis    Mononucleosis (mono)    Myasthenia gravis    Parkinson's disease    Pheochromocytoma    Reye syndrome    Seizure disorder    Ulcerative colitis   Do you have any of these conditions that can affect the immune system? Scroll to see all options. Select all that apply.    None of these   Not selected:    History of bone marrow transplant    Chronic kidney disease    Chronic liver disease (including cirrhosis)    HIV/AIDS    Inflammatory bowel disease (Crohn's disease or ulcerative colitis)    Lupus    Moderate to severe plaque psoriasis    Multiple sclerosis    Rheumatoid arthritis    Sickle cell anemia    Alpha or beta thalassemia    History of solid organ transplant (kidney, liver, or heart)    History of spleen removal    An autoimmune disorder not listed here    A condition requiring treatment with long-term use of oral steroids (such as prednisone, prednisolone, or dexamethasone)   Have you ever been diagnosed with cancer? Select one.    No   Not selected:    Yes, I have cancer now    Yes, but I'm in remission   Have you ever had either of these conditions? Select all that apply.    No   Not selected:    Metoclopramide-associated dystonic  reaction    Tardive dyskinesia   Do any of these apply to the people who live with you? Select all that apply.    None of the above   Not selected:    A child under the age of 5    An adult 65 or older    A person who is pregnant    A person who has given birth, had a miscarriage, had a pregnancy loss, or had an  in the last 2 weeks    An  or Native [place]   Does any member of your household have any of these medical conditions? Select all that apply.    None of the above   Not selected:    Asthma    Disorders of the brain, spinal cord, or nerves and muscles, such as dementia, cerebral palsy, epilepsy, muscular dystrophy, or developmental delay    Chronic lung disease, such as COPD or cystic fibrosis    Heart disease, such as congenital heart disease, congestive heart failure, or coronary artery disease    Cerebrovascular disease, such as stroke or another condition affecting the blood vessels or blood supply to the brain    Blood disorders, such as sickle cell disease    Diabetes    Metabolic disorders such as inherited metabolic disorders or mitochondrial disease    Kidney disorders    Liver disorders    Weakened immune system due to illness or medications such as chemotherapy or steroids    Children under the age of 19 who are on long-term aspirin therapy    Extreme obesity (BMI > 40)   Are you pregnant? Select one.    No   Not selected:    Yes   When was your last menstrual period? If you don't currently have periods or no longer have periods, please briefly explain.    I've had a Hysterectomy x18 yrs ago   Within the last 2 weeks, have you: - Given birth - Had a miscarriage - Had a pregnancy loss - Had an  Being postpartum (live birth or loss) within the last 2 weeks increases your risk of flu complications. Select one.    No   Not selected:    Yes   Are you breastfeeding? Select one.    No   Not selected:    Yes   Just a few more questions about medications, and then you're  finished. Have you used any non-prescription medications or nasal sprays for your current symptoms? Examples include saline sprays, decongestants, NyQuil, and Tylenol. Select one.    Yes   Not selected:    No   Which of these non-prescription medications have you tried? Scroll to see all options. Select all that apply.    Ibuprofen (Advil, Motrin, Midol)    Phenylephrine (Sudafed)   Not selected:    Acetaminophen (Tylenol)    Budesonide (Rhinocort)    Cetirizine (Zyrtec)    Chlorpheniramine (Aller-chlor, Chlor-Trimeton)    Cromolyn (NasalCrom)    Dextromethorphan (Delsym, Robitussin, Vicks DayQuil Cough)    Diphenhydramine (Benadryl)    Fexofenadine (Allegra)    Fluticasone (Flonase)    Guaifenesin (Mucinex)    Guaifenesin/dextromethorphan (Delsym DM, Mucinex DM, Robitussin DM)    Ketotifen (Alaway, Zaditor)    Loratadine (Alavert, Claritin)    Naphazoline-pheniramine (Naphcon-A, Opcon-A, Visine-A)    Omeprazole (Prilosec)    Oxymetazoline (Afrin)    Triamcinolone (Nasacort)    None of the above   In the past month, have you taken antibiotics for similar symptoms? Examples of antibiotics include amoxicillin, amoxicillin-clavulanate (Augmentin), penicillin, cefdinir (Omnicef), doxycycline, and clindamycin (Cleocin). Select one.    No   Not selected:    Yes    I'm not sure   Have you taken any monoamine oxidase inhibitor (MAOI) medications in the last 14 days? Examples include rasagiline (Azilect), selegiline (Eldepryl, Zelapar), isocarboxazid (Marplan), phenelzine (Nardil), and tranylcypromine (Parnate). Select one.    No   Not selected:    Yes    I'm not sure   Do you take Kynmobi or Apokyn (apomorphine)? Select one.    No   Not selected:    Yes    I'm not sure   Are you taking any other medications or supplements? On the next screen, you need to list all vitamins, supplements, non-prescription medications (such as aspirin or Aleve), and prescription medications that you're taking. Select one.    No   Not  selected:    Yes    Yes, but I'm not sure what they are   Have you ever had an allergic or bad reaction to any medication? Select one.    No   Not selected:    Yes   Are you allergic to milk or to the proteins found in milk (for example, whey or casein)? A milk allergy is different from lactose intolerance. Select one.    No   Not selected:    Yes    I'm not sure   Have you ever had jaundice or liver problems as a result of taking amoxicillin-clavulanate (Augmentin)? Jaundice is a condition in which the skin and the whites of the eyes turn yellow. Select all that apply.    No, not that I know of   Not selected:    Yes, jaundice    Yes, liver problems   Have you ever had jaundice or liver problems as a result of taking azithromycin (Zithromax, Zmax)? Jaundice is a condition in which the skin and the whites of the eyes turn yellow. Select all that apply.    No, not that I know of   Not selected:    Yes, jaundice    Yes, liver problems   Do you need a doctor's note? A doctor's note confirms that you received care today and states when you can return to school or work. It does not contain information about your diagnosis or treatment plan. Your provider will make the final decision on whether to give you a doctor's note and for how long. Doctor's notes CANNOT be backdated. We can't provide medical leave paperwork through this type of visit. If more paperwork is needed to request time off, contact your primary care provider. Select one.    No   Not selected:    Today only (1 day)    Today and tomorrow (2 days)    3 days    7 days    10 days    14 days   Is there anything else you'd like to tell us about your symptoms?   The patient did not enter any additional information.   ----------   Medical history   Medical history data does not currently exist for this patient.

## 2022-08-12 ENCOUNTER — LAB (OUTPATIENT)
Dept: FAMILY MEDICINE CLINIC | Facility: CLINIC | Age: 42
End: 2022-08-12

## 2022-08-12 ENCOUNTER — OFFICE VISIT (OUTPATIENT)
Dept: FAMILY MEDICINE CLINIC | Facility: CLINIC | Age: 42
End: 2022-08-12

## 2022-08-12 VITALS
OXYGEN SATURATION: 98 % | HEART RATE: 73 BPM | SYSTOLIC BLOOD PRESSURE: 92 MMHG | BODY MASS INDEX: 19.81 KG/M2 | HEIGHT: 64 IN | WEIGHT: 116 LBS | DIASTOLIC BLOOD PRESSURE: 64 MMHG

## 2022-08-12 DIAGNOSIS — F41.9 ANXIETY: ICD-10-CM

## 2022-08-12 DIAGNOSIS — Z00.00 HEALTH MAINTENANCE EXAMINATION: Primary | ICD-10-CM

## 2022-08-12 DIAGNOSIS — Z00.00 HEALTH MAINTENANCE EXAMINATION: ICD-10-CM

## 2022-08-12 LAB
ALBUMIN SERPL-MCNC: 5 G/DL (ref 3.5–5.2)
ALBUMIN/GLOB SERPL: 2.8 G/DL
ALP SERPL-CCNC: 60 U/L (ref 39–117)
ALT SERPL W P-5'-P-CCNC: 12 U/L (ref 1–33)
ANION GAP SERPL CALCULATED.3IONS-SCNC: 12.2 MMOL/L (ref 5–15)
AST SERPL-CCNC: 17 U/L (ref 1–32)
BILIRUB SERPL-MCNC: 1 MG/DL (ref 0–1.2)
BUN SERPL-MCNC: 11 MG/DL (ref 6–20)
BUN/CREAT SERPL: 11.2 (ref 7–25)
CALCIUM SPEC-SCNC: 9.5 MG/DL (ref 8.6–10.5)
CHLORIDE SERPL-SCNC: 103 MMOL/L (ref 98–107)
CHOLEST SERPL-MCNC: 172 MG/DL (ref 0–200)
CO2 SERPL-SCNC: 24.8 MMOL/L (ref 22–29)
CREAT SERPL-MCNC: 0.98 MG/DL (ref 0.57–1)
EGFRCR SERPLBLD CKD-EPI 2021: 73.6 ML/MIN/1.73
GLOBULIN UR ELPH-MCNC: 1.8 GM/DL
GLUCOSE SERPL-MCNC: 80 MG/DL (ref 65–99)
HCV AB SER DONR QL: NORMAL
HDLC SERPL-MCNC: 47 MG/DL (ref 40–60)
LDLC SERPL CALC-MCNC: 109 MG/DL (ref 0–100)
LDLC/HDLC SERPL: 2.31 {RATIO}
POTASSIUM SERPL-SCNC: 4.1 MMOL/L (ref 3.5–5.2)
PROT SERPL-MCNC: 6.8 G/DL (ref 6–8.5)
SODIUM SERPL-SCNC: 140 MMOL/L (ref 136–145)
TRIGL SERPL-MCNC: 83 MG/DL (ref 0–150)
TSH SERPL DL<=0.05 MIU/L-ACNC: 1.68 UIU/ML (ref 0.27–4.2)
VLDLC SERPL-MCNC: 16 MG/DL (ref 5–40)

## 2022-08-12 PROCEDURE — 80053 COMPREHEN METABOLIC PANEL: CPT | Performed by: NURSE PRACTITIONER

## 2022-08-12 PROCEDURE — 99213 OFFICE O/P EST LOW 20 MIN: CPT | Performed by: NURSE PRACTITIONER

## 2022-08-12 PROCEDURE — 36415 COLL VENOUS BLD VENIPUNCTURE: CPT

## 2022-08-12 PROCEDURE — 84443 ASSAY THYROID STIM HORMONE: CPT | Performed by: NURSE PRACTITIONER

## 2022-08-12 PROCEDURE — 86803 HEPATITIS C AB TEST: CPT | Performed by: NURSE PRACTITIONER

## 2022-08-12 PROCEDURE — 99396 PREV VISIT EST AGE 40-64: CPT | Performed by: NURSE PRACTITIONER

## 2022-08-12 PROCEDURE — 80061 LIPID PANEL: CPT | Performed by: NURSE PRACTITIONER

## 2022-08-12 RX ORDER — ESCITALOPRAM OXALATE 10 MG/1
10 TABLET ORAL DAILY
Qty: 30 TABLET | Refills: 1 | Status: SHIPPED | OUTPATIENT
Start: 2022-08-12

## 2022-08-12 NOTE — PROGRESS NOTES
Subjective   Deja Ibarra is a 42 y.o. female.       HPI   Pt is new to our office today.   Medical/social/family hx reviewed.   Here for routine physical.   Last pap was with GYN; has had hysterectomy.    Mammogram UTD.   Immunizations reviewed.   Labs due.   Non-smoker.     Having some trouble with anxiety.  Mainly at night; having anxious thoughts which causes trouble with sleep. Has to take after hours call at times so can't take anything that makes her not able to be roused.  Denies any SI or HI.     The following portions of the patient's history were reviewed and updated as appropriate: allergies, current medications, past family history, past medical history, past social history, past surgical history and problem list.    Review of Systems   Constitutional: Negative for chills, fatigue and fever.   Eyes: Negative for blurred vision and visual disturbance.   Respiratory: Negative for cough, chest tightness, shortness of breath and wheezing.    Cardiovascular: Negative for chest pain, palpitations and leg swelling.   Gastrointestinal: Negative for abdominal distention, abdominal pain, blood in stool, constipation, diarrhea, nausea, vomiting and indigestion.   Genitourinary: Negative for dysuria, flank pain, frequency, hematuria and urgency.   Musculoskeletal: Negative for arthralgias, back pain and myalgias.   Neurological: Negative for dizziness, weakness and headache.   Psychiatric/Behavioral: Positive for stress. Negative for self-injury, suicidal ideas and depressed mood. The patient is nervous/anxious.        Objective   Physical Exam  Vitals reviewed.   Constitutional:       General: She is not in acute distress.     Appearance: Normal appearance.   HENT:      Head: Normocephalic and atraumatic.   Cardiovascular:      Rate and Rhythm: Normal rate and regular rhythm.      Pulses: Normal pulses.      Heart sounds: Normal heart sounds. No murmur heard.  Pulmonary:      Effort: Pulmonary effort is normal.  No respiratory distress.      Breath sounds: Normal breath sounds. No wheezing.   Abdominal:      General: Bowel sounds are normal. There is no distension.      Palpations: Abdomen is soft.      Tenderness: There is no abdominal tenderness. There is no right CVA tenderness or left CVA tenderness.   Musculoskeletal:      Cervical back: Normal range of motion and neck supple. No tenderness.      Right lower leg: No edema.      Left lower leg: No edema.   Skin:     General: Skin is warm and dry.      Findings: No erythema.   Neurological:      General: No focal deficit present.      Mental Status: She is alert and oriented to person, place, and time.   Psychiatric:         Mood and Affect: Mood normal.           Assessment & Plan   Diagnoses and all orders for this visit:    1. Health maintenance examination (Primary)  Comments:  Medical/social/family hx reviewed.   Labs ordered.   Pap and mammogram UTD.   Immunizations reviewed.   Orders:  -     Comprehensive metabolic panel; Future  -     Lipid panel; Future  -     Hepatitis C antibody; Future  -     TSH; Future    2. Anxiety  Comments:  Given escitalopram 10 mg daily.   RTO in 4 weeks.   Orders:  -     escitalopram (Lexapro) 10 MG tablet; Take 1 tablet by mouth Daily.  Dispense: 30 tablet; Refill: 1

## 2023-04-04 ENCOUNTER — TRANSCRIBE ORDERS (OUTPATIENT)
Dept: ADMINISTRATIVE | Facility: HOSPITAL | Age: 43
End: 2023-04-04
Payer: COMMERCIAL

## 2023-04-04 DIAGNOSIS — Z12.39 SCREENING BREAST EXAMINATION: Primary | ICD-10-CM

## 2023-04-10 ENCOUNTER — HOSPITAL ENCOUNTER (OUTPATIENT)
Dept: MAMMOGRAPHY | Facility: HOSPITAL | Age: 43
Discharge: HOME | End: 2023-04-10
Admitting: NURSE PRACTITIONER
Payer: COMMERCIAL

## 2023-04-10 DIAGNOSIS — Z12.39 SCREENING BREAST EXAMINATION: ICD-10-CM

## 2023-04-10 PROCEDURE — 77063 BREAST TOMOSYNTHESIS BI: CPT

## 2023-04-10 PROCEDURE — 77067 SCR MAMMO BI INCL CAD: CPT

## 2023-06-02 ENCOUNTER — OFFICE VISIT (OUTPATIENT)
Dept: FAMILY MEDICINE CLINIC | Facility: CLINIC | Age: 43
End: 2023-06-02

## 2023-06-02 VITALS
WEIGHT: 116 LBS | HEART RATE: 63 BPM | SYSTOLIC BLOOD PRESSURE: 102 MMHG | BODY MASS INDEX: 19.81 KG/M2 | HEIGHT: 64 IN | DIASTOLIC BLOOD PRESSURE: 67 MMHG | OXYGEN SATURATION: 98 %

## 2023-06-02 DIAGNOSIS — F41.9 ANXIETY: ICD-10-CM

## 2023-06-02 PROCEDURE — 99213 OFFICE O/P EST LOW 20 MIN: CPT | Performed by: NURSE PRACTITIONER

## 2023-06-02 RX ORDER — ESCITALOPRAM OXALATE 10 MG/1
10 TABLET ORAL DAILY
Qty: 90 TABLET | Refills: 3 | Status: SHIPPED | OUTPATIENT
Start: 2023-06-02

## 2023-06-02 NOTE — PROGRESS NOTES
Subjective   Deja Ibarra is a 43 y.o. female.       HPI   Pt is here today for 6 month follow up on anxiety.   Currently on escitalopram 10 mg daily. Given med to start 6 months ago but reports she only recently started to take it.  Has been taking about 2 months.  Feels like sleep is improving.  Anxiety lower; moods stable.   Denies any SI or HI.     The following portions of the patient's history were reviewed and updated as appropriate: allergies, current medications, past family history, past medical history, past social history, past surgical history and problem list.    Review of Systems   Constitutional: Negative for chills, fatigue and fever.   Respiratory: Negative for cough and shortness of breath.    Cardiovascular: Negative for chest pain and palpitations.   Gastrointestinal: Negative for diarrhea, nausea and vomiting.   Psychiatric/Behavioral: Negative for dysphoric mood, self-injury, suicidal ideas and depressed mood. The patient is not nervous/anxious.        Objective   Physical Exam  Vitals reviewed.   Constitutional:       General: She is not in acute distress.     Appearance: Normal appearance.   Cardiovascular:      Rate and Rhythm: Normal rate and regular rhythm.      Pulses: Normal pulses.      Heart sounds: Normal heart sounds. No murmur heard.  Pulmonary:      Effort: Pulmonary effort is normal. No respiratory distress.      Breath sounds: Normal breath sounds. No wheezing or rhonchi.   Chest:      Chest wall: No tenderness.   Abdominal:      Tenderness: There is no right CVA tenderness or left CVA tenderness.   Neurological:      General: No focal deficit present.      Mental Status: She is alert and oriented to person, place, and time.   Psychiatric:         Attention and Perception: Attention normal.         Mood and Affect: Mood normal.         Speech: Speech normal.         Behavior: Behavior normal. Behavior is cooperative.         Thought Content: Thought content does not include  homicidal or suicidal ideation.           Assessment & Plan   Diagnoses and all orders for this visit:    1. Anxiety  Comments:  Stable.   Cont. current medication.   RTO in 1 yr.   Orders:  -     escitalopram (Lexapro) 10 MG tablet; Take 1 tablet by mouth Daily.  Dispense: 90 tablet; Refill: 3

## 2023-12-18 ENCOUNTER — LAB (OUTPATIENT)
Dept: LAB | Facility: HOSPITAL | Age: 43
End: 2023-12-18
Payer: COMMERCIAL

## 2023-12-18 ENCOUNTER — TRANSCRIBE ORDERS (OUTPATIENT)
Dept: ADMINISTRATIVE | Facility: HOSPITAL | Age: 43
End: 2023-12-18
Payer: COMMERCIAL

## 2023-12-18 DIAGNOSIS — D64.9 ANEMIA, UNSPECIFIED TYPE: ICD-10-CM

## 2023-12-18 DIAGNOSIS — F34.81 SEVERE MOOD DYSREGULATION DISORDER: ICD-10-CM

## 2023-12-18 DIAGNOSIS — R68.82 DECREASED LIBIDO: ICD-10-CM

## 2023-12-18 DIAGNOSIS — K59.9 FUNCTIONAL DISORDER OF INTESTINE: ICD-10-CM

## 2023-12-18 DIAGNOSIS — E55.9 AVITAMINOSIS D: ICD-10-CM

## 2023-12-18 DIAGNOSIS — E01.2 ENDEMIC GOITER: ICD-10-CM

## 2023-12-18 DIAGNOSIS — E86.0 DEHYDRATION: ICD-10-CM

## 2023-12-18 DIAGNOSIS — E63.9 NUTRITIONAL DISORDER: ICD-10-CM

## 2023-12-18 DIAGNOSIS — R87.1 ABNORMAL HORMONE LEVEL IN SPECIMEN FROM FEMALE GENITAL ORGAN: ICD-10-CM

## 2023-12-18 DIAGNOSIS — E56.9 MULTIPLE VITAMIN DEFICIENCY DISEASE: ICD-10-CM

## 2023-12-18 DIAGNOSIS — D64.9 ANEMIA, UNSPECIFIED TYPE: Primary | ICD-10-CM

## 2023-12-18 LAB
25(OH)D3 SERPL-MCNC: 48.2 NG/ML (ref 30–100)
BASOPHILS # BLD AUTO: 0.03 10*3/MM3 (ref 0–0.2)
BASOPHILS NFR BLD AUTO: 0.6 % (ref 0–1.5)
CORTIS SERPL-MCNC: 12.7 MCG/DL
CRP SERPL-MCNC: <0.02 MG/DL (ref 0.01–0.5)
DEPRECATED RDW RBC AUTO: 40.1 FL (ref 37–54)
EOSINOPHIL # BLD AUTO: 0.13 10*3/MM3 (ref 0–0.4)
EOSINOPHIL NFR BLD AUTO: 2.6 % (ref 0.3–6.2)
ERYTHROCYTE [DISTWIDTH] IN BLOOD BY AUTOMATED COUNT: 12.2 % (ref 12.3–15.4)
ERYTHROCYTE [SEDIMENTATION RATE] IN BLOOD: <1 MM/HR (ref 0–20)
ESTRADIOL SERPL HS-MCNC: <5 PG/ML
FERRITIN SERPL-MCNC: 62.7 NG/ML (ref 13–150)
FOLATE SERPL-MCNC: 11 NG/ML (ref 4.78–24.2)
FSH SERPL-ACNC: 114 MIU/ML
HCT VFR BLD AUTO: 41.8 % (ref 34–46.6)
HCYS SERPL-MCNC: 10.6 UMOL/L (ref 0–15)
HGB BLD-MCNC: 13.7 G/DL (ref 12–15.9)
IMM GRANULOCYTES # BLD AUTO: 0.01 10*3/MM3 (ref 0–0.05)
IMM GRANULOCYTES NFR BLD AUTO: 0.2 % (ref 0–0.5)
IRON 24H UR-MRATE: 114 MCG/DL (ref 37–145)
IRON SATN MFR SERPL: 29 % (ref 20–50)
LH SERPL-ACNC: 73.7 MIU/ML
LYMPHOCYTES # BLD AUTO: 1.57 10*3/MM3 (ref 0.7–3.1)
LYMPHOCYTES NFR BLD AUTO: 31 % (ref 19.6–45.3)
MCH RBC QN AUTO: 29.7 PG (ref 26.6–33)
MCHC RBC AUTO-ENTMCNC: 32.8 G/DL (ref 31.5–35.7)
MCV RBC AUTO: 90.5 FL (ref 79–97)
MONOCYTES # BLD AUTO: 0.4 10*3/MM3 (ref 0.1–0.9)
MONOCYTES NFR BLD AUTO: 7.9 % (ref 5–12)
NEUTROPHILS NFR BLD AUTO: 2.92 10*3/MM3 (ref 1.7–7)
NEUTROPHILS NFR BLD AUTO: 57.7 % (ref 42.7–76)
NRBC BLD AUTO-RTO: 0 /100 WBC (ref 0–0.2)
PLATELET # BLD AUTO: 268 10*3/MM3 (ref 140–450)
PMV BLD AUTO: 10.6 FL (ref 6–12)
PROGEST SERPL-MCNC: 0.17 NG/ML
RBC # BLD AUTO: 4.62 10*6/MM3 (ref 3.77–5.28)
T3FREE SERPL-MCNC: 3.62 PG/ML (ref 2–4.4)
TIBC SERPL-MCNC: 399 MCG/DL (ref 298–536)
TRANSFERRIN SERPL-MCNC: 268 MG/DL (ref 200–360)
TRIGL SERPL-MCNC: 159 MG/DL (ref 0–150)
TSH SERPL DL<=0.05 MIU/L-ACNC: 2.72 UIU/ML (ref 0.27–4.2)
VIT B12 BLD-MCNC: 456 PG/ML (ref 211–946)
WBC NRBC COR # BLD AUTO: 5.06 10*3/MM3 (ref 3.4–10.8)

## 2023-12-18 PROCEDURE — 84481 FREE ASSAY (FT-3): CPT

## 2023-12-18 PROCEDURE — 36415 COLL VENOUS BLD VENIPUNCTURE: CPT

## 2023-12-18 PROCEDURE — 86141 C-REACTIVE PROTEIN HS: CPT

## 2023-12-18 PROCEDURE — 84466 ASSAY OF TRANSFERRIN: CPT

## 2023-12-18 PROCEDURE — 83002 ASSAY OF GONADOTROPIN (LH): CPT

## 2023-12-18 PROCEDURE — 82306 VITAMIN D 25 HYDROXY: CPT

## 2023-12-18 PROCEDURE — 84144 ASSAY OF PROGESTERONE: CPT

## 2023-12-18 PROCEDURE — 83090 ASSAY OF HOMOCYSTEINE: CPT

## 2023-12-18 PROCEDURE — 82728 ASSAY OF FERRITIN: CPT

## 2023-12-18 PROCEDURE — 84443 ASSAY THYROID STIM HORMONE: CPT

## 2023-12-18 PROCEDURE — 84482 T3 REVERSE: CPT

## 2023-12-18 PROCEDURE — 82607 VITAMIN B-12: CPT

## 2023-12-18 PROCEDURE — 84402 ASSAY OF FREE TESTOSTERONE: CPT

## 2023-12-18 PROCEDURE — 82627 DEHYDROEPIANDROSTERONE: CPT

## 2023-12-18 PROCEDURE — 83001 ASSAY OF GONADOTROPIN (FSH): CPT

## 2023-12-18 PROCEDURE — 85652 RBC SED RATE AUTOMATED: CPT

## 2023-12-18 PROCEDURE — 83540 ASSAY OF IRON: CPT

## 2023-12-18 PROCEDURE — 82533 TOTAL CORTISOL: CPT

## 2023-12-18 PROCEDURE — 84140 ASSAY OF PREGNENOLONE: CPT

## 2023-12-18 PROCEDURE — 85025 COMPLETE CBC W/AUTO DIFF WBC: CPT

## 2023-12-18 PROCEDURE — 82746 ASSAY OF FOLIC ACID SERUM: CPT

## 2023-12-18 PROCEDURE — 84403 ASSAY OF TOTAL TESTOSTERONE: CPT

## 2023-12-18 PROCEDURE — 84478 ASSAY OF TRIGLYCERIDES: CPT

## 2023-12-18 PROCEDURE — 82670 ASSAY OF TOTAL ESTRADIOL: CPT

## 2023-12-18 PROCEDURE — 86376 MICROSOMAL ANTIBODY EACH: CPT

## 2023-12-18 PROCEDURE — 83789 MASS SPECTROMETRY QUAL/QUAN: CPT

## 2023-12-19 LAB
DHEA-S SERPL-MCNC: 143 UG/DL (ref 57.3–279.2)
THYROPEROXIDASE AB SERPL-ACNC: <9 IU/ML (ref 0–34)

## 2023-12-21 LAB
IODINE SERPL-MCNC: 50.1 UG/L (ref 40–92)
TESTOST FREE SERPL-MCNC: 0.9 PG/ML (ref 0–4.2)
TESTOST SERPL-MCNC: 13.8 NG/DL

## 2023-12-23 LAB — T3REVERSE SERPL-MCNC: 12.6 NG/DL (ref 9.2–24.1)

## 2023-12-24 LAB — PREG SERPL-MCNC: 48 NG/DL

## 2023-12-28 ENCOUNTER — E-VISIT (OUTPATIENT)
Dept: FAMILY MEDICINE CLINIC | Facility: TELEHEALTH | Age: 43
End: 2023-12-28
Payer: COMMERCIAL

## 2023-12-28 NOTE — EXTERNAL PATIENT INSTRUCTIONS
Note   I have prescribed Amoxicillin. You can use warm salt water gargles or Chloraseptic throat lozenges for thraot pain. If your symptoms donot improve or become worse, follow up with your doctor.   Diagnosis   Strep pharyngitis (strep throat)   My name is YESI Russell, EDGARD, and I'm a healthcare provider at [facility]. I reviewed your interview, and I see that you have strep throat. This can be a painful condition, but it responds well to treatment.   To prevent the spread of illness to others, stay home and away from other people as much as possible while you're sick. When you need to be around other people, consider wearing a face mask.   Medications   Your pharmacy   [facility] - [address] [place] [address] [phone]     Prescription   Amoxicillin (500mg): Take 1 capsule by mouth twice a day for 10 days for infection. This medication is an antibiotic. Take it exactly as directed. You must finish the entire course of medication, even if you feel better after the first few days of treatment.    Start taking the antibiotics I've prescribed right away. You need to finish the entire course of antibiotics, even if you start to feel better before the pills run out.    Some people develop a yeast infection after taking antibiotics. If you get a yeast infection, you can treat it with antifungal creams or suppositories. These are available without a prescription at drugstores and many supermarkets.   About your diagnosis   Strep throat is an infection in the throat and tonsils caused by group A streptococcus bacteria. Strep throat is most often spread by droplets that are released into the air after an infected person coughs or sneezes. You can also get strep throat by sharing cups or utensils with an infected person.   Symptoms usually begin within 2 to 5 days after a person has been exposed. The pain from strep throat usually starts quickly and can be severe, especially  "when swallowing. Body aches and pains are common.   What to expect   If you follow this treatment plan, you should start to feel better within a few days.   When to seek care   Call us at [phone]   with any sudden or unexpected symptoms.    Symptoms that don't improve within 48 hours of starting antibiotics.    Symptoms that get better for a few days and then suddenly get worse.    Worsening fever.    Your throat pain becomes worse and makes swallowing extremely difficult or impossible.    Muffled voice (it may sound like you are talking with a mouthful of food).    Difficulty opening your mouth or jaw.    Stiff neck.    Joint pain, or swelling that moves from joint to joint.    Severe stomach pain.    Shortness of breath.    Nosebleed.    Severe fatigue.    A new rash.    Odd emotional or behavioral changes.    Uncontrollable body movements or \"jerkiness.\"    Severe chest pain.   Other treatment    Rest and drink plenty of water.    Choose throat-friendly liquids and foods, such as lemon tea, broth, applesauce, frozen yogurt, or popsicles.    Gargle with salt water several times a day to help with your throat pain.    Try cough drops and throat lozenges for extra relief.    Stir a teaspoon of honey into warm water or weak tea to help soothe a sore throat.    Avoid smoke and air pollution. Smoke can make infections worse.   Prevention    Avoid close contact with other people when you're sick.    Cover your mouth and nose when you cough or sneeze. Use a tissue or cough into your elbow. Make sure that used tissues go directly into the trash.    Avoid touching your eyes, nose, or mouth while you're sick.    Wash your hands often, especially after coughing, sneezing, or blowing your nose. If soap and water are not available, use an alcohol-based hand .    If you or someone in your home or workplace is sick, disinfect commonly used items. This includes door handles, tables, computers, remotes, and " pens.    Coronavirus (COVID-19) information   Common symptoms of COVID-19 include fever, cough, shortness of breath, fatigue, muscle or body aches, headaches, new loss of sense of taste or smell, sore throat, stuffy or runny nose, nausea or vomiting, and diarrhea. Most people who get COVID-19 have mild symptoms and can rest at home until they get better. Elderly people and those with chronic medical problems may be at risk for more serious complications.   FAQs about the COVID-19 vaccine   Are the vaccines safe?   Yes. Hundreds of millions of people in the [place] have already safely received COVID-19 vaccines under the most intense safety monitoring in the history of the [place].   Do I need the vaccine if I've already had COVID?   Yes. Vaccination helps protect you even if you've already had COVID.   If you had COVID-19 and had symptoms, wait to get vaccinated until you've recovered and completed your isolation period.   If you tested positive for COVID-19 but did not have symptoms, you can get vaccinated after 5 full days have passed since you had a positive test, as long as you don't develop symptoms.   How many doses of the vaccine do I need?   Visit www.cdc.gov/coronavirus/2019-ncov/vaccines/stay-up-to-date.html   to find out how to stay up to date with your COVID-19 vaccines.   I'm immunocompromised. How many doses of the vaccine do I need?   For information on how immunocompromised people can stay up to date with their COVID-19 vaccines, visit www.cdc.gov/coronavirus/2019-ncov/vaccines/recommendations/immuno.html  .   What are the common side effects of the vaccine?   A sore arm, tiredness, headache, and muscle pain may occur within two days of getting the vaccine and last a day or two. For the Moderna or Pfizer vaccines, side effects are more common after the second dose. People over the age of 55 are less likely to have side effects than younger people.   After I'm up to date on vaccines, can I still get or spread  COVID?   Yes, you can still get COVID, but your disease should be milder. And your risk of serious illness, hospitalization, and complications will be much lower, especially if you're up to date. Unfortunately, you can still spread COVID if you've been vaccinated. That's why it's important to follow isolation guidelines if you get sick or test positive.   After I'm up to date on vaccines, can I go back to normal?   You should still wear a mask indoors in public if:    It's required by laws, rules, regulations, or local guidance.    You have a weakened immune system.    Your age puts you at increased risk of severe disease.    You have a medical condition that puts you at increased risk of severe disease.    Someone in your household has a weakened immune system, is at increased risk for severe disease, or is unvaccinated.    You're in an area of high transmission.   Where can I get a COVID-19 vaccine?   Visit [facility]'s website for more information. To find a COVID-19 vaccination site near you, visit www.vaccines.gov/  , call [phone]  , or text your zip code to [id number] ([employer]). Message and data rates may apply.   I've had close contact with someone who has COVID. Do I need to quarantine, and if so, for how long?   For the most current answer, including a calculator to determine whether you need to stay home and for how long, visit www.cdc.gov/coronavirus/2019-ncov/your-health/isolation.html  .   I've tested positive for COVID. How long do I need to isolate?   For the latest recommendations, including a calculator to determine how long you need to stay home, visit www.cdc.gov/coronavirus/2019-ncov/your-health/isolation.html  .   What if I develop symptoms that might be from COVID?   For the latest recommendations on what to do if you're sick, including when to seek emergency care, visit www.cdc.gov/coronavirus/2019-ncov/if-you-are-sick/index.html  .    Flu vaccine information   Who should get a flu  vaccine?   Everyone 6 months of age and older should get a yearly flu vaccine.   When should I get vaccinated?   You should get a flu vaccine by the end of October. Once you're vaccinated, it takes about two weeks for antibodies to develop and protect you against the flu. That's why it's important to get vaccinated as soon as possible.   After October, is it too late to get vaccinated?   No. You should still get vaccinated. As long as the flu viruses are still in your community, flu vaccines will remain available, even into January of next year or later.   Why do I need a flu vaccine EVERY year?   Flu viruses are constantly changing, so flu vaccines are usually updated from one season to the next. Your protection from the flu vaccine also lessens over time.   Is the flu vaccine safe?   Yes. Over the last 50 years, hundreds of millions of Americans have safely received the flu vaccines.   What are the side effects of flu vaccines?   You CANNOT get the flu from a flu vaccine. Common side effects of the flu shot include soreness, redness and/or swelling where the shot was given, low grade fever, and aches. Common side effects of the nasal spray flu vaccine for adults include runny nose, headaches, sore throat, and cough. For children, side effects include wheezing, vomiting, muscle aches, and fever.   Does the flu vaccine increase your risk of getting COVID-19?   No. There is no evidence that getting a flu vaccine increases your risk of getting COVID-19.   Is it safe to get the flu vaccine along with a COVID-19 vaccine?   Yes. It's safe to get the flu vaccine with a COVID-19 vaccine or booster.   Contact your healthcare provider TODAY for details on when and where to get your flu vaccine.   Your provider   Your diagnosis was provided by YESI Russell FNP-BC, a member of your trusted care team at [facility].   If you have any questions, call us at [phone]  .

## 2023-12-28 NOTE — E-VISIT TREATED
Chief Complaint: Cold, flu, COVID, sinus, hay fever, or seasonal allergies   Patient introduction   Patient is 43-year-old female with sore throat and voice hoarseness that started less than 48 hours ago. Regarding date of symptom onset, patient writes: 12-27-23.   COVID-19 testing history, vaccination status, and exposure:    Patient was tested for COVID-19 within the last 24 hours. Test result was negative.    Vaccinated with the updated [id number] COVID-19 vaccine (Pfizer-BioNTech or Moderna vaccine after September 12, 2023; or Novavax vaccine after October 3, 2023). Received their most recent dose of the vaccine more than 14 days ago.    No known exposure to a person with a confirmed or suspected case of COVID-19.    No high-risk (household) exposure to COVID-19 within the last 14 days.   General presentation   Symptoms came on gradually.   Fever:    No fever.   Sinus and nasal symptoms:    No sinus pain or pressure.    No nasal or sinus congestion.    No nasal discharge.    No itchy nose or sneezing.   Throat symptoms:    Sore throat.    Has pain when swallowing but can swallow liquids and solid foods.    Voice is moderately hoarse. Patient does not believe hoarseness is due to voice strain.   Head and body aches:    No headache.    No sweats.    No chills.    No myalgia.    No fatigue.   Cough:    No cough.   Wheezing and shortness of breath:    No COPD diagnosis.    No asthma diagnosis.    No wheezing.    No shortness of breath.   Chest pain:    No chest pain.   Ear symptoms:    None.   Dizziness:    No dizziness.   Allergies:    No history of allergies.   Flu exposure:    Recent distant-proximity exposure to a person with a confirmed flu diagnosis.    Received a flu vaccine in the last 1 to 3 months.   Not taking any over-the-counter medications for current symptoms.   Review of red flags/alarm symptoms:    No changes in alertness or awareness.    No symptoms suggesting airway obstruction.    No decreased  "urination.    No blue or gray coloring present in face, lips, or nail beds.    No swelling, pain, redness, or increased warmth in the calf or lower part of ONE leg only.   Pregnancy/menstrual status/breastfeeding:    Not pregnant.    Not breastfeeding.    Regarding date of last menstrual period, patient writes: Hysterectomy 20 yrs ago.   Self-exam:    Height: 5' 4\"    Weight: 123 lbs    Tonsillar edema.    Tonsils appear normal.    Palatal petechiae.    Neck lymph nodes feel normal.   Recent antibiotic use:    Has not taken antibiotics for similar symptoms within the past month.   Current medications   Not taking other medications or supplements.   Medication allergies   None.   Medication contraindication review   No history of anaphylactic reaction to beta-lactam antibiotics; aspirin triad; blood dyscrasia; bone marrow depression; catecholamine-releasing paraganglioma; coronary artery disease; coagulation disorder; congenital long QT syndrome; depression; electrolyte abnormalities; fungal infection; GI bleeding; GI obstruction; G6PD deficiency; heart arrhythmia; hypertension; mononucleosis; myasthenia; recent myocardial infarction; NSAID-induced asthma/urticaria; Parkinson's disease; pheochromocytoma; porphyria; Reye syndrome; seizure disorder; ulcerative colitis; and urinary retention.   No history of metoclopramide-associated dystonic reaction and tardive dyskinesia.   No known history of amoxicillin-clavulanate-associated cholestatic jaundice or hepatic impairment.   No known history of azithromycin-associated cholestatic jaundice or hepatic impairment.   Past medical history   Immune conditions:   No immunocompromising conditions.   No history of cancer.   Social history   Never smoked tobacco.   Patient did not request an excuse note.   Assessment   Strep pharyngitis.   This is the likely diagnosis based on patient's interview responses, including:    Symptom profile   Plan   Medications:    amoxicillin 500 " mg capsule RX 500mg 1 cap PO bid 10d for infection. This medication is an antibiotic. Take it exactly as directed. You must finish the entire course of medication, even if you feel better after the first few days of treatment. Amount is 20 cap.   The patient's prescription will be sent to:   [facility]   [address] [place] [address] [place] [address]   Phone: [phone]     Fax: [phone]   Education:    Condition and causes    Prevention    Treatment and self-care    When to call provider   ----------   Electronically signed by YESI Russell FNP-BC on 2023-12-28 at 05:47AM   ----------   Patient Interview Transcript:   Which of these symptoms are bothering you? Select all that apply.    Sore throat    Hoarse voice or loss of voice   Not selected:    Cough    Shortness of breath    Stuffed-up nose or sinuses    Runny nose    Itchy or watery eyes    Itchy nose or sneezing    Loss of smell or taste    Headache    Fever    Sweats    Chills    Muscle or body aches    Fatigue or tiredness    Nausea or vomiting    Diarrhea    I don't have any of these symptoms   When did your current symptoms start? Select one.    Less than 48 hours ago   Not selected:    3 to 5 days ago    6 to 9 days ago    10 to 14 days ago    2 to 3 weeks ago    3 to 4 weeks ago    More than a month ago   Do you know the exact date your symptoms started? If so, enter the date as MM/DD/YY. Select one.    Yes (specify): 12-27-23   Not selected:    No   Did your symptoms come on suddenly or gradually? Select one.    Gradually   Not selected:    Suddenly    I'm not sure   Since your current symptoms started, have you been tested for COVID-19? This includes home self-tests as well as nose swab or saliva tests done at a doctor's office, lab, or testing site. Select one.    Yes   Not selected:    No   When was your most recent COVID-19 test? Select one.    Within the last 24 hours   Not selected:    Within the last week (specify  "date as MM/DD/YY)    7 to 14 days ago    15 to 30 days ago    More than 1 month ago   What was the result of your most recent COVID-19 test? Select one.    Negative   Not selected:    Positive   Has anyone in your household tested positive for COVID-19 in the past 14 days? Select one.    No   Not selected:    Yes   In the last 14 days, have you had close contact with someone who has COVID-19? \"Close contact\" means any of these: - Caring for someone with COVID-19. - Being within 6 feet of someone with COVID-19 for a total of at least 15 minutes over a 24-hour period. For example, three 5-minute exposures for a total of 15 minutes. - Being in direct contact with respiratory droplets from someone with COVID-19 (being coughed on, kissing, sharing utensils). Select one.    No, not that I know of   Not selected:    Yes, a confirmed case    Yes, a suspected case   Have you gotten the [id number] updated COVID-19 vaccine? This means either the updated Pfizer-BioNTech or Moderna vaccine after September 12, 2023; or the updated Novavax vaccine after October 3, 2023. Select one.    Yes   Not selected:    No   When did you get the updated COVID-19 vaccine? Select one.    More than 14 days ago   Not selected:    Less than 48 hours (2 days) ago    48 to 72 hours (3 days) ago    3 to 5 days ago    5 to 7 days ago    7 to 14 days ago   Since your symptoms started, have you felt dizzy? Select one.    No   Not selected:    Yes, but I can still do my regular daily activities    Yes, and it makes it hard to stand, walk, or do daily activities   Do you feel sinus pain or pressure in any of these areas?    No   Not selected:    In my forehead    Around my eyes    Behind my nose    In my cheeks    In my upper teeth or jaw   Can you swallow liquids and solid foods? A sore throat may be painful when swallowing, but it shouldn't prevent you from swallowing. Select one.    Yes, but it's painful   Not selected:    Yes, with ease    Yes, but it's " uncomfortable    It's hard to swallow anything because it feels like liquids and food get stuck in my throat    No, I can't swallow anything, liquid or solid foods   Is your throat pain worse on one side than the other? Select one.    No, it's the same on both sides   Not selected:    Yes, it's worse on the right side    Yes, it's worse on the left side   Do you have chest pain? You might also feel it as discomfort, aching, tightness, or squeezing in the chest. Select one.    No   Not selected:    Yes   Have you urinated at least 3 times in the last 24 hours? Select one.    Yes   Not selected:    No   Do your face, lips, or nail beds appear blue or gray? Select one.    No   Not selected:    Yes   Do you have any swelling, pain, redness, or increased warmth in the calf or lower part of ONE leg only? Select one.    No   Not selected:    Yes   Changes in alertness or awareness may mean you need emergency care. Since your symptoms started, have you had any of these? Select all that apply.    None of the above   Not selected:    Confusion    Slurred speech    Not knowing where you are or what day it is    Difficulty staying conscious    Fainting or passing out   Do your symptoms include a whistling sound, or wheezing, when you breathe? Select one.    No   Not selected:    Yes   Early in this interview, you told us you were hoarse or you'd lost your voice. How would you describe the changes to your voice? Select one.    It's harder than usual to talk   Not selected:    It just sounds a little raspy    I can barely talk at all   Is it possible that you strained your voice? Singing, yelling, or talking more or louder than usual can cause voice strain. Select one.    No, not that I know of   Not selected:    Yes   Do you have any of these symptoms in your ear(s)? Select all that apply.    None of the above   Not selected:    Pain    Pressure    Fullness    Crackling or popping    Plugged or blocked sensation   Are your  tonsils larger than usual?    Yes   Not selected:    No, not that I can tell    I've had my tonsils removed   Is there any white or yellow pus on your tonsils?    No, not that I can see   Not selected:    Yes   Are there red spots on the roof of your mouth or the back of your throat?    Yes   Not selected:    No, not that I can see   Are your glands/lymph nodes swollen, or does it hurt when you touch them?    No, not that I can tell   Not selected:    Yes   In the past week, has anyone around you (such as at school, work, or home) had a confirmed diagnosis of the flu? A confirmed diagnosis means that a nose swab was done to verify a flu infection. Select all that apply.    I've been in the same building as someone who has the flu   Not selected:    I live with someone who has the flu    I've been within touching distance of someone who has the flu    I've walked by, or sat about 3 feet away from, someone who has the flu    No, not that I know of   Have you ever been diagnosed with asthma? Select one.    No   Not selected:    Yes   Have you ever been diagnosed with chronic obstructive pulmonary disease (COPD)? Select one.    No, not that I know of   Not selected:    Yes   In the past month, have you taken antibiotics for similar symptoms? Examples of antibiotics include amoxicillin, amoxicillin-clavulanate (Augmentin), penicillin, cefdinir (Omnicef), doxycycline, and clindamycin (Cleocin). Select one.    No   Not selected:    Yes (specify)   Do you have allergies (pollen, dust mites, mold, animal dander)? Select one.    No, not that I know of   Not selected:    Yes   Have you had a flu shot this season? Select one.    Yes, 1 to 3 months ago   Not selected:    Yes, less than 2 weeks ago    Yes, 2 to 4 weeks ago    Yes, 3 to 6 months ago    Yes, more than 6 months ago    No   Are you pregnant? Select one.    No   Not selected:    Yes   When was your last menstrual period? If you don't currently have periods or no  longer have periods, please briefly explain.    Hysterectomy 20 yrs ago   Within the last 2 weeks, have you: - Given birth - Had a miscarriage - Had a pregnancy loss - Had an  Being postpartum (live birth or loss) within the last 2 weeks increases your risk of flu complications. Select one.    No   Not selected:    Yes   Are you breastfeeding? Select one.    No   Not selected:    Yes   The flu and COVID-19 can be more serious for people in certain groups. The next few questions help us figure out if you or anyone you live with is at higher risk for complications from these infections. Do any of these statements apply to you? Select all that apply.    None of the above   Not selected:    I'm     I'm     I'm Black    I'm  or    Do you smoke tobacco? Select one.    No   Not selected:    Yes, every day    Yes, some days    No, I quit   Do you have a mostly inactive lifestyle? Answer yes if all of these are true: - You spend at least 6 hours a day sitting or lying down - You get less than 2 and a half hours per week of moderate exercise such as walking fast - You get less than 1 hour and 15 minutes per week of intense exercise such as jogging or running Select one.    No   Not selected:    Yes   Do you have any of these conditions? Select all that apply.    None of the above   Not selected:    Chronic lung disease, such as cystic fibrosis or interstitial fibrosis    Heart disease, such as congenital heart disease, congestive heart failure, or coronary artery disease    Disorder of the brain, spinal cord, or nerves and muscles, such as dementia, cerebral palsy, epilepsy, muscular dystrophy, or developmental delay    Metabolic disorder or mitochondrial disease    Cerebrovascular disease, such as stroke or another condition affecting the blood vessels or blood supply to the brain    Down syndrome    Mood disorder, including depression or schizophrenia spectrum disorders     Substance use disorder, such as alcohol, opioid, or cocaine use disorder    Tuberculosis    Primary immunodeficiency   Do you live in a group care setting? Examples include: - Nursing home - Residential care - Psychiatric treatment facility - Group home - Dormitory - Board and care home - Homeless shelter - Foster care setting Select one.    No   Not selected:    Yes   Are you a healthcare worker? Select one.    Yes   Not selected:    No   People with a very high body mass index (BMI) are at higher risk for developing complications from the flu and severe illness from COVID-19. To determine your BMI, we need to know your weight and height. Please enter your weight (in pounds).    Weight   Please enter your height.    Height   Do you have any of these conditions that can affect the immune system? Scroll to see all options. Select all that apply.    None of these   Not selected:    History of bone marrow transplant    Chronic kidney disease    Chronic liver disease (including cirrhosis)    HIV/AIDS    Inflammatory bowel disease (Crohn's disease or ulcerative colitis)    Lupus    Moderate to severe plaque psoriasis    Multiple sclerosis    Rheumatoid arthritis    Sickle cell anemia    Alpha or beta thalassemia    History of solid organ transplant (kidney, liver, or heart)    History of spleen removal    An autoimmune disorder not listed here (specify)    A condition requiring treatment with long-term use of oral steroids (such as prednisone, prednisolone, or dexamethasone) (specify)   Have you ever been diagnosed with cancer? Select one.    No   Not selected:    Yes, I have cancer now    Yes, but I'm in remission   Do any of these apply to you? Select all that apply.    None of the above   Not selected:    I've been hospitalized within the last 5 days    I have diabetes    I'm in close contact with a child in    The flu and COVID-19 can be more serious for people in certain groups. Do any of these apply to the  people who live with you? Select all that apply.    None of the above   Not selected:    Under age 5    Over age 65            Black     or     Pregnant    Has given birth, had a miscarriage, had a pregnancy loss, or had an  in the last 2 weeks   Does any member of your household have any of these medical conditions? Select all that apply.    None of the above   Not selected:    Asthma    Disorders of the brain, spinal cord, or nerves and muscles, such as dementia, cerebral palsy, epilepsy, muscular dystrophy, or developmental delay    Chronic lung disease, such as COPD or cystic fibrosis    Heart disease, such as congenital heart disease, congestive heart failure, or coronary artery disease    Cerebrovascular disease, such as stroke or another condition affecting the blood vessels or blood supply to the brain    Blood disorders, such as sickle cell disease    Diabetes    Metabolic disorders such as inherited metabolic disorders or mitochondrial disease    Kidney disorders    Liver disorders    Weakened immune system due to illness or medications such as chemotherapy or steroids    Children under the age of 19 who are on long-term aspirin therapy    Extreme obesity (BMI > 40)   Do you have any of these conditions? Scroll to see all options. Select all that apply.    None of the above   Not selected:    Aspirin triad (also known as Samter's triad or ASA triad)    Asthma or hives from taking aspirin or other NSAIDs, such as ibuprofen or naproxen    Blockage or narrowing of the blood vessels of the heart    Blood clotting disorder    Blood dyscrasia, such anemia, leukemia, lymphoma, or myeloma    Bone marrow depression    Catecholamine-releasing paraganglioma    Congenital long QT syndrome    Depression    Difficulty urinating or completely emptying your bladder    Uncorrected electrolyte abnormalities    Fungal infection    Gastrointestinal (GI) bleeding     Gastrointestinal (GI) obstruction    G6PD deficiency    Recent heart attack    High blood pressure    Irregular heartbeat or heart rhythm    Mononucleosis (mono)    Myasthenia gravis    Parkinson's disease    Pheochromocytoma    Reye syndrome    Seizure disorder    Thyroid disease    Ulcerative colitis   Have you ever had either of these conditions? Select all that apply.    No   Not selected:    Metoclopramide-associated dystonic reaction    Tardive dyskinesia   Just a few more questions about medications, and then you're finished. Have you used any non-prescription medications or nasal sprays for your current symptoms? Examples include saline sprays, decongestants, NyQuil, and Tylenol. Select one.    No   Not selected:    Yes   Have you taken any monoamine oxidase inhibitor (MAOI) medications in the last 14 days? Examples include rasagiline (Azilect), selegiline (Eldepryl, Zelapar), isocarboxazid (Marplan), phenelzine (Nardil), and tranylcypromine (Parnate). Select one.    No, not that I know of   Not selected:    Yes   Do you take Kynmobi or Apokyn (apomorphine)? Select one.    No   Not selected:    Yes   Are you taking any other medications, vitamins, or supplements? Select one.    No   Not selected:    Yes   Have you ever had an allergic or bad reaction to any medication? Select one.    No   Not selected:    Yes   Are you allergic to milk or to the proteins found in milk (for example, whey or casein)? A milk allergy is different from lactose intolerance. Select one.    No, not that I know of   Not selected:    Yes   Have you ever had jaundice or liver problems as a result of taking amoxicillin-clavulanate (Augmentin)? Jaundice is a condition in which the skin and the whites of the eyes turn yellow. Select all that apply.    No, not that I know of   Not selected:    Yes, jaundice    Yes, liver problems   Have you ever had jaundice or liver problems as a result of taking azithromycin (Zithromax, Zmax)? Jaundice  is a condition in which the skin and the whites of the eyes turn yellow. Select all that apply.    No, not that I know of   Not selected:    Yes, jaundice    Yes, liver problems   Do you need a doctor's note? A doctor's note confirms that you received care today and states when you can return to school or work. It does not contain information about your diagnosis or treatment plan. Your provider will make the final decision on whether to give you a doctor's note and for how long. Doctor's notes CANNOT be backdated. We can't provide medical leave paperwork through this type of visit. If more paperwork is needed to request time off, contact your primary care provider. Select one.    No   Not selected:    Today only (1 day)    Today and tomorrow (2 days)    3 days    5 days    7 days   Is there anything you'd like to add about your symptoms? Please limit your comments to the symptoms asked about in this interview. If you include comments about other concerns, your provider may recommend that you be seen in person.   The patient did not enter any additional information.   ----------   Medical history   Medical history data does not currently exist for this patient.

## 2024-02-29 NOTE — PROGRESS NOTES
Subjective   Deja Ibarra is a 44 y.o. female.       HPI   Pt is here today for routine physical exam.    Medical/social/family hx reviewed.   Last pap was with GYN but GYN has retired from practice.  Pt is due this exam.   Mammogram April 2023.   Colon screening - due  Immunizations reviewed.   Non-smoker.   Getting regular dental and vision screens.     The following portions of the patient's history were reviewed and updated as appropriate: allergies, current medications, past family history, past medical history, past social history, past surgical history, and problem list.    Review of Systems   Constitutional:  Negative for chills, fatigue and fever.   Respiratory:  Negative for cough and shortness of breath.    Cardiovascular:  Negative for chest pain and palpitations.   Gastrointestinal:  Negative for abdominal pain, blood in stool, constipation, diarrhea, nausea, vomiting and indigestion.   Genitourinary:  Negative for breast discharge, breast lump, breast pain, dysuria, frequency, hematuria, pelvic pain, pelvic pressure, vaginal bleeding, vaginal discharge and vaginal pain.   Neurological:  Negative for dizziness, weakness, light-headedness and headache.   Psychiatric/Behavioral:  Negative for depressed mood. The patient is not nervous/anxious.        Objective   Physical Exam  Vitals reviewed. Exam conducted with a chaperone present.   Constitutional:       General: She is not in acute distress.     Appearance: Normal appearance.   Cardiovascular:      Rate and Rhythm: Normal rate and regular rhythm.      Pulses: Normal pulses.      Heart sounds: Normal heart sounds. No murmur heard.  Pulmonary:      Effort: Pulmonary effort is normal. No respiratory distress.      Breath sounds: Normal breath sounds. No wheezing or rhonchi.   Chest:      Chest wall: No tenderness.   Abdominal:      General: Bowel sounds are normal. There is no distension.      Palpations: Abdomen is soft.      Tenderness: There is no  abdominal tenderness. There is no right CVA tenderness, left CVA tenderness, guarding or rebound.   Genitourinary:     Labia:         Right: No rash, tenderness, lesion or injury.         Left: No rash, tenderness, lesion or injury.       Vagina: Normal.      Uterus: Absent.       Adnexa: Right adnexa normal and left adnexa normal.        Right: No mass, tenderness or fullness.          Left: No mass, tenderness or fullness.        Rectum: Normal.      Comments: Pt has had partial hysterectomy  Musculoskeletal:      Cervical back: Normal range of motion and neck supple.   Skin:     General: Skin is warm and dry.      Findings: No erythema.   Neurological:      General: No focal deficit present.      Mental Status: She is alert and oriented to person, place, and time.   Psychiatric:         Mood and Affect: Mood normal.         BMI is within normal parameters. No other follow-up for BMI required.       Procedures   Assessment & Plan   Diagnoses and all orders for this visit:    1. Routine gynecological examination (Primary)  Comments:  Pap today.   Mammogram due after April 10, 2024; order given.   Labs ordered.   Immunizations reviewed.  Orders:  -     Comprehensive metabolic panel; Future  -     Lipid panel; Future  -     IGP,Aptima HPV,Age Gdln    2. Colon cancer screening  Comments:  Order given for screening colonoscopy.  Orders:  -     Ambulatory Referral For Screening Colonoscopy    3. Breast cancer screening by mammogram  Comments:  Order given for screening mammogram  Orders:  -     Mammo Screening Digital Tomosynthesis Bilateral With CAD; Future

## 2024-03-01 ENCOUNTER — OFFICE VISIT (OUTPATIENT)
Dept: FAMILY MEDICINE CLINIC | Facility: CLINIC | Age: 44
End: 2024-03-01
Payer: COMMERCIAL

## 2024-03-01 ENCOUNTER — LAB (OUTPATIENT)
Dept: FAMILY MEDICINE CLINIC | Facility: CLINIC | Age: 44
End: 2024-03-01
Payer: COMMERCIAL

## 2024-03-01 VITALS
BODY MASS INDEX: 21.51 KG/M2 | HEIGHT: 64 IN | WEIGHT: 126 LBS | SYSTOLIC BLOOD PRESSURE: 103 MMHG | HEART RATE: 74 BPM | DIASTOLIC BLOOD PRESSURE: 71 MMHG | OXYGEN SATURATION: 99 %

## 2024-03-01 DIAGNOSIS — Z12.11 COLON CANCER SCREENING: ICD-10-CM

## 2024-03-01 DIAGNOSIS — Z12.31 BREAST CANCER SCREENING BY MAMMOGRAM: ICD-10-CM

## 2024-03-01 DIAGNOSIS — Z01.419 ROUTINE GYNECOLOGICAL EXAMINATION: Primary | ICD-10-CM

## 2024-03-01 DIAGNOSIS — Z01.419 ROUTINE GYNECOLOGICAL EXAMINATION: ICD-10-CM

## 2024-03-01 LAB
ALBUMIN SERPL-MCNC: 4.9 G/DL (ref 3.5–5.2)
ALBUMIN/GLOB SERPL: 2 G/DL
ALP SERPL-CCNC: 82 U/L (ref 39–117)
ALT SERPL W P-5'-P-CCNC: 13 U/L (ref 1–33)
ANION GAP SERPL CALCULATED.3IONS-SCNC: 12 MMOL/L (ref 5–15)
AST SERPL-CCNC: 20 U/L (ref 1–32)
BILIRUB SERPL-MCNC: 1.2 MG/DL (ref 0–1.2)
BUN SERPL-MCNC: 16 MG/DL (ref 6–20)
BUN/CREAT SERPL: 14.3 (ref 7–25)
CALCIUM SPEC-SCNC: 9.7 MG/DL (ref 8.6–10.5)
CHLORIDE SERPL-SCNC: 104 MMOL/L (ref 98–107)
CHOLEST SERPL-MCNC: 208 MG/DL (ref 0–200)
CO2 SERPL-SCNC: 25 MMOL/L (ref 22–29)
CREAT SERPL-MCNC: 1.12 MG/DL (ref 0.57–1)
EGFRCR SERPLBLD CKD-EPI 2021: 61.9 ML/MIN/1.73
GLOBULIN UR ELPH-MCNC: 2.5 GM/DL
GLUCOSE SERPL-MCNC: 81 MG/DL (ref 65–99)
HDLC SERPL-MCNC: 53 MG/DL (ref 40–60)
LDLC SERPL CALC-MCNC: 141 MG/DL (ref 0–100)
LDLC/HDLC SERPL: 2.63 {RATIO}
POTASSIUM SERPL-SCNC: 4.2 MMOL/L (ref 3.5–5.2)
PROT SERPL-MCNC: 7.4 G/DL (ref 6–8.5)
SODIUM SERPL-SCNC: 141 MMOL/L (ref 136–145)
TRIGL SERPL-MCNC: 78 MG/DL (ref 0–150)
VLDLC SERPL-MCNC: 14 MG/DL (ref 5–40)

## 2024-03-01 PROCEDURE — 36415 COLL VENOUS BLD VENIPUNCTURE: CPT

## 2024-03-01 PROCEDURE — 80053 COMPREHEN METABOLIC PANEL: CPT | Performed by: NURSE PRACTITIONER

## 2024-03-01 PROCEDURE — 80061 LIPID PANEL: CPT | Performed by: NURSE PRACTITIONER

## 2024-03-01 RX ORDER — CALCIUM CARBONATE/VITAMIN D3 600 MG-10
TABLET ORAL
COMMUNITY
Start: 2024-01-15

## 2024-03-01 RX ORDER — MELATONIN 10 MG
TABLET, SUBLINGUAL SUBLINGUAL
COMMUNITY
Start: 2024-01-15

## 2024-03-03 DIAGNOSIS — R79.89 ELEVATED SERUM CREATININE: Primary | ICD-10-CM

## 2024-03-05 ENCOUNTER — LAB (OUTPATIENT)
Dept: FAMILY MEDICINE CLINIC | Facility: CLINIC | Age: 44
End: 2024-03-05
Payer: COMMERCIAL

## 2024-03-05 DIAGNOSIS — R79.89 ELEVATED SERUM CREATININE: ICD-10-CM

## 2024-03-05 LAB
AGE GDLN ACOG TESTING: NORMAL
ANION GAP SERPL CALCULATED.3IONS-SCNC: 12.6 MMOL/L (ref 5–15)
BUN SERPL-MCNC: 16 MG/DL (ref 6–20)
BUN/CREAT SERPL: 18 (ref 7–25)
CALCIUM SPEC-SCNC: 9.9 MG/DL (ref 8.6–10.5)
CHLORIDE SERPL-SCNC: 102 MMOL/L (ref 98–107)
CO2 SERPL-SCNC: 25.4 MMOL/L (ref 22–29)
CREAT SERPL-MCNC: 0.89 MG/DL (ref 0.57–1)
CYTOLOGIST CVX/VAG CYTO: NORMAL
CYTOLOGY CVX/VAG DOC CYTO: NORMAL
CYTOLOGY CVX/VAG DOC THIN PREP: NORMAL
DX ICD CODE: NORMAL
EGFRCR SERPLBLD CKD-EPI 2021: 81.6 ML/MIN/1.73
GLUCOSE SERPL-MCNC: 96 MG/DL (ref 65–99)
HPV GENOTYPE REFLEX: NORMAL
HPV I/H RISK 4 DNA CVX QL PROBE+SIG AMP: NEGATIVE
Lab: NORMAL
OTHER STN SPEC: NORMAL
POTASSIUM SERPL-SCNC: 3.9 MMOL/L (ref 3.5–5.2)
SODIUM SERPL-SCNC: 140 MMOL/L (ref 136–145)
STAT OF ADQ CVX/VAG CYTO-IMP: NORMAL

## 2024-03-05 PROCEDURE — 36415 COLL VENOUS BLD VENIPUNCTURE: CPT

## 2024-03-05 PROCEDURE — 80048 BASIC METABOLIC PNL TOTAL CA: CPT | Performed by: NURSE PRACTITIONER

## 2024-03-20 ENCOUNTER — TRANSCRIBE ORDERS (OUTPATIENT)
Dept: ADMINISTRATIVE | Facility: HOSPITAL | Age: 44
End: 2024-03-20
Payer: COMMERCIAL

## 2024-03-20 ENCOUNTER — LAB (OUTPATIENT)
Dept: LAB | Facility: HOSPITAL | Age: 44
End: 2024-03-20
Payer: COMMERCIAL

## 2024-03-20 DIAGNOSIS — R79.9 ABNORMAL BLOOD CHEMISTRY: ICD-10-CM

## 2024-03-20 DIAGNOSIS — E55.9 AVITAMINOSIS D: ICD-10-CM

## 2024-03-20 DIAGNOSIS — G93.32 CHRONIC FATIGUE SYNDROME: ICD-10-CM

## 2024-03-20 DIAGNOSIS — N95.2 POSTMENOPAUSAL ATROPHIC VAGINITIS: ICD-10-CM

## 2024-03-20 DIAGNOSIS — E01.2 ENDEMIC GOITER: ICD-10-CM

## 2024-03-20 DIAGNOSIS — R94.7 NONSPECIFIC ABNORMAL RESULTS OF ENDOCRINE FUNCTION STUDY: ICD-10-CM

## 2024-03-20 DIAGNOSIS — F34.81 SEVERE MOOD DYSREGULATION DISORDER: ICD-10-CM

## 2024-03-20 DIAGNOSIS — R63.5 ABNORMAL WEIGHT GAIN: ICD-10-CM

## 2024-03-20 DIAGNOSIS — R53.83 TIREDNESS: ICD-10-CM

## 2024-03-20 DIAGNOSIS — R68.82 DECREASED LIBIDO: ICD-10-CM

## 2024-03-20 DIAGNOSIS — Z13.6 SCREENING FOR ISCHEMIC HEART DISEASE: ICD-10-CM

## 2024-03-20 DIAGNOSIS — R37 SEXUAL DYSFUNCTION: ICD-10-CM

## 2024-03-20 DIAGNOSIS — R23.2 FLUSHING: ICD-10-CM

## 2024-03-20 DIAGNOSIS — R87.1 ABNORMAL HORMONE LEVEL IN SPECIMEN FROM FEMALE GENITAL ORGAN: ICD-10-CM

## 2024-03-20 DIAGNOSIS — E34.9 ENDOCRINE DISORDER RELATED TO PUBERTY: ICD-10-CM

## 2024-03-20 DIAGNOSIS — Z84.81 FAMILY HISTORY OF GENETIC DISEASE CARRIER: ICD-10-CM

## 2024-03-20 DIAGNOSIS — N95.1 SYMPTOMATIC MENOPAUSAL OR FEMALE CLIMACTERIC STATES: ICD-10-CM

## 2024-03-20 DIAGNOSIS — F41.9 ANXIETY: Primary | ICD-10-CM

## 2024-03-20 DIAGNOSIS — F41.9 ANXIETY: ICD-10-CM

## 2024-03-20 DIAGNOSIS — E86.0 DEHYDRATION: ICD-10-CM

## 2024-03-20 DIAGNOSIS — Z79.890 NEED FOR PROPHYLACTIC HORMONE REPLACEMENT THERAPY (POSTMENOPAUSAL): ICD-10-CM

## 2024-03-20 DIAGNOSIS — Z13.220 SCREENING FOR LIPOID DISORDERS: ICD-10-CM

## 2024-03-20 DIAGNOSIS — E78.9 DISORDER OF LIPOPROTEIN AND LIPID METABOLISM: ICD-10-CM

## 2024-03-20 DIAGNOSIS — E63.9 NUTRITIONAL DEFICIENCY: ICD-10-CM

## 2024-03-20 DIAGNOSIS — E56.9 MULTIPLE VITAMIN DEFICIENCY DISEASE: ICD-10-CM

## 2024-03-20 DIAGNOSIS — Z82.49 FAMILY HISTORY OF ISCHEMIC HEART DISEASE: ICD-10-CM

## 2024-03-20 LAB
25(OH)D3 SERPL-MCNC: 106 NG/ML (ref 30–100)
CORTIS SERPL-MCNC: 13.1 MCG/DL
ESTRADIOL SERPL HS-MCNC: 41 PG/ML
FOLATE SERPL-MCNC: 16.4 NG/ML (ref 4.78–24.2)
HCYS SERPL-MCNC: 8.3 UMOL/L (ref 0–15)
PROGEST SERPL-MCNC: 4.99 NG/ML
T3FREE SERPL-MCNC: 3.38 PG/ML (ref 2–4.4)
TSH SERPL DL<=0.05 MIU/L-ACNC: 1.76 UIU/ML (ref 0.27–4.2)
VIT B12 BLD-MCNC: 1081 PG/ML (ref 211–946)

## 2024-03-20 PROCEDURE — 82607 VITAMIN B-12: CPT

## 2024-03-20 PROCEDURE — 84140 ASSAY OF PREGNENOLONE: CPT

## 2024-03-20 PROCEDURE — 84482 T3 REVERSE: CPT

## 2024-03-20 PROCEDURE — 84481 FREE ASSAY (FT-3): CPT

## 2024-03-20 PROCEDURE — 82306 VITAMIN D 25 HYDROXY: CPT

## 2024-03-20 PROCEDURE — 83789 MASS SPECTROMETRY QUAL/QUAN: CPT

## 2024-03-20 PROCEDURE — 82627 DEHYDROEPIANDROSTERONE: CPT

## 2024-03-20 PROCEDURE — 84144 ASSAY OF PROGESTERONE: CPT

## 2024-03-20 PROCEDURE — 83090 ASSAY OF HOMOCYSTEINE: CPT

## 2024-03-20 PROCEDURE — 36415 COLL VENOUS BLD VENIPUNCTURE: CPT

## 2024-03-20 PROCEDURE — 82670 ASSAY OF TOTAL ESTRADIOL: CPT

## 2024-03-20 PROCEDURE — 82533 TOTAL CORTISOL: CPT

## 2024-03-20 PROCEDURE — 84403 ASSAY OF TOTAL TESTOSTERONE: CPT

## 2024-03-20 PROCEDURE — 84402 ASSAY OF FREE TESTOSTERONE: CPT

## 2024-03-20 PROCEDURE — 82746 ASSAY OF FOLIC ACID SERUM: CPT

## 2024-03-20 PROCEDURE — 84443 ASSAY THYROID STIM HORMONE: CPT

## 2024-03-21 LAB — DHEA-S SERPL-MCNC: 279 UG/DL (ref 41.2–243.7)

## 2024-03-23 LAB
IODINE SERPL-MCNC: 49.9 UG/L (ref 40–92)
T3REVERSE SERPL-MCNC: 16.8 NG/DL (ref 9.2–24.1)

## 2024-03-24 LAB
TESTOST FREE SERPL-MCNC: 2.5 PG/ML (ref 0–4.2)
TESTOST SERPL-MCNC: 28.1 NG/DL

## 2024-03-26 LAB — PREG SERPL-MCNC: 327 NG/DL

## 2024-04-16 ENCOUNTER — HOSPITAL ENCOUNTER (OUTPATIENT)
Dept: MAMMOGRAPHY | Facility: HOSPITAL | Age: 44
Discharge: HOME | End: 2024-04-16
Admitting: NURSE PRACTITIONER
Payer: COMMERCIAL

## 2024-04-16 DIAGNOSIS — Z12.31 BREAST CANCER SCREENING BY MAMMOGRAM: ICD-10-CM

## 2024-04-16 PROCEDURE — 77067 SCR MAMMO BI INCL CAD: CPT

## 2024-04-16 PROCEDURE — 77063 BREAST TOMOSYNTHESIS BI: CPT

## 2024-08-01 PROBLEM — Z12.11 SCREENING FOR COLON CANCER: Status: ACTIVE | Noted: 2024-08-01

## 2024-08-01 NOTE — H&P
GI CONSULT  NOTE:    Referring Provider:    Saray Watkins APRN [facility] McColloug*    Chief complaint: Screening for colon cancer    History of present illness:      Deja Ibarra is a 44 y.o. female who presents today for Procedure(s):  COLONOSCOPY for the indications listed below.     The updated Patient Profile was reviewed prior to the procedure, in conjunction with the Physical Exam, including medical conditions, surgical procedures, medications, allergies, family history and social history.     Pre-operatively, I reviewed the indication(s) for the procedure, the risks of the procedure [including but not limited to: unexpected bleeding possibly requiring hospitalization and/or unplanned repeat procedures, perforation possibly requiring surgical treatment, missed lesions and complications of sedation/MAC (also explained by anesthesia staff)].     I have evaluated the patient for risks associated with the planned anesthesia and the procedure to be performed and find the patient an acceptable candidate for IV sedation.    Multiple opportunities were provided for any questions or concerns, and all questions were answered satisfactorily before any anesthesia was administered. We will proceed with the planned procedure.    Past Medical History:  Past Medical History:   Diagnosis Date    Hearing loss of right ear 03/19/2021       Past Surgical History:  Past Surgical History:   Procedure Laterality Date    BREAST AUGMENTATION  2011    implants    BREAST SURGERY  2011    Implants    COSMETIC SURGERY  2011    Implants    SUBTOTAL HYSTERECTOMY  2004    VAGINAL HYSTERECTOMY  2004    Still has ovaries. Benign pathology       Social History:  Social History     Tobacco Use    Smoking status: Never    Smokeless tobacco: Never   Vaping Use    Vaping status: Never Used   Substance Use Topics    Alcohol use: Yes     Alcohol/week: 4.0 standard drinks of alcohol     Types: 2 Glasses of wine, 2 Drinks containing 0.5 oz  "of alcohol per week    Drug use: Never       Family History:  Family History   Problem Relation Age of Onset    Hypertension Father     Malig Hypertension Father     Breast cancer Maternal Grandmother     Hypertension Mother        Medications:  No medications prior to admission.       Scheduled Meds:  Continuous Infusions:No current facility-administered medications for this encounter.    PRN Meds:.    ALLERGIES:  Patient has no known allergies.    ROS:  The following systems were reviewed and negative;   Constitution:  No fevers, chills, no unintentional weight loss  Skin: no rash, no jaundice  Eyes:  No blurry vision, no eye pain  HENT:  No change in hearing or smell  Resp:  No dyspnea or cough  CV:  No chest pain or palpitations  :  No dysuria, hematuria  Musculoskeletal:  No leg cramps or arthralgias  Neuro:  No tremor, no numbness  Psych:  No depression or confusion    Objective     Vital Signs:   Vitals:    07/24/24 1043   Weight: 57.6 kg (127 lb)   Height: 162.6 cm (64\")       Physical Exam:       General Appearance:    Awake and alert, in no acute distress   Head:    Normocephalic, without obvious abnormality, atraumatic   Throat:   No oral lesions, no thrush, oral mucosa moist   Lungs:     respirations regular, even and unlabored   Skin:   No rash, no jaundice       Results Review:  Lab Results (last 24 hours)       ** No results found for the last 24 hours. **            Imaging Results (Last 24 Hours)       ** No results found for the last 24 hours. **             I reviewed the patient's labs and imaging.    ASSESSMENT AND PLAN:      Principal Problem:    Screening for colon cancer       Procedure(s):  COLONOSCOPY      I discussed the patients findings and my recommendations with the patient.    Electronically signed by Brad Dykes MD, 08/01/24, 7:25 PM EDT.              "

## 2024-08-02 ENCOUNTER — ON CAMPUS - OUTPATIENT (OUTPATIENT)
Dept: URBAN - METROPOLITAN AREA HOSPITAL 85 | Facility: HOSPITAL | Age: 44
End: 2024-08-02
Payer: COMMERCIAL

## 2024-08-02 ENCOUNTER — HOSPITAL ENCOUNTER (OUTPATIENT)
Facility: HOSPITAL | Age: 44
Setting detail: HOSPITAL OUTPATIENT SURGERY
Discharge: HOME | End: 2024-08-02
Attending: INTERNAL MEDICINE | Admitting: INTERNAL MEDICINE
Payer: COMMERCIAL

## 2024-08-02 ENCOUNTER — ANESTHESIA EVENT (OUTPATIENT)
Dept: GASTROENTEROLOGY | Facility: HOSPITAL | Age: 44
End: 2024-08-02
Payer: COMMERCIAL

## 2024-08-02 ENCOUNTER — ANESTHESIA (OUTPATIENT)
Dept: GASTROENTEROLOGY | Facility: HOSPITAL | Age: 44
End: 2024-08-02
Payer: COMMERCIAL

## 2024-08-02 VITALS
HEIGHT: 64 IN | BODY MASS INDEX: 21.6 KG/M2 | DIASTOLIC BLOOD PRESSURE: 63 MMHG | SYSTOLIC BLOOD PRESSURE: 107 MMHG | OXYGEN SATURATION: 99 % | TEMPERATURE: 98.9 F | RESPIRATION RATE: 14 BRPM | WEIGHT: 126.54 LBS | HEART RATE: 78 BPM

## 2024-08-02 DIAGNOSIS — Z12.11 ENCOUNTER FOR SCREENING FOR MALIGNANT NEOPLASM OF COLON: ICD-10-CM

## 2024-08-02 PROCEDURE — 25010000002 PHENYLEPHRINE 10 MG/ML SOLUTION: Performed by: NURSE ANESTHETIST, CERTIFIED REGISTERED

## 2024-08-02 PROCEDURE — 45378 DIAGNOSTIC COLONOSCOPY: CPT | Mod: 33 | Performed by: INTERNAL MEDICINE

## 2024-08-02 PROCEDURE — 25810000003 SODIUM CHLORIDE 0.9 % SOLUTION: Performed by: INTERNAL MEDICINE

## 2024-08-02 PROCEDURE — 25010000002 PROPOFOL 1000 MG/100ML EMULSION: Performed by: NURSE ANESTHETIST, CERTIFIED REGISTERED

## 2024-08-02 PROCEDURE — 25010000002 GLYCOPYRROLATE 0.2 MG/ML SOLUTION: Performed by: NURSE ANESTHETIST, CERTIFIED REGISTERED

## 2024-08-02 RX ORDER — LIDOCAINE HYDROCHLORIDE 20 MG/ML
INJECTION, SOLUTION EPIDURAL; INFILTRATION; INTRACAUDAL; PERINEURAL AS NEEDED
Status: DISCONTINUED | OUTPATIENT
Start: 2024-08-02 | End: 2024-08-02 | Stop reason: SURG

## 2024-08-02 RX ORDER — GLYCOPYRROLATE 0.2 MG/ML
INJECTION INTRAMUSCULAR; INTRAVENOUS AS NEEDED
Status: DISCONTINUED | OUTPATIENT
Start: 2024-08-02 | End: 2024-08-02 | Stop reason: SURG

## 2024-08-02 RX ORDER — ONDANSETRON 2 MG/ML
4 INJECTION INTRAMUSCULAR; INTRAVENOUS ONCE AS NEEDED
Status: DISCONTINUED | OUTPATIENT
Start: 2024-08-02 | End: 2024-08-02 | Stop reason: HOSPADM

## 2024-08-02 RX ORDER — SODIUM CHLORIDE 9 MG/ML
50 INJECTION, SOLUTION INTRAVENOUS CONTINUOUS
Status: DISCONTINUED | OUTPATIENT
Start: 2024-08-02 | End: 2024-08-02 | Stop reason: HOSPADM

## 2024-08-02 RX ORDER — PHENYLEPHRINE HYDROCHLORIDE 10 MG/ML
INJECTION INTRAVENOUS AS NEEDED
Status: DISCONTINUED | OUTPATIENT
Start: 2024-08-02 | End: 2024-08-02 | Stop reason: SURG

## 2024-08-02 RX ORDER — PROPOFOL 10 MG/ML
INJECTION, EMULSION INTRAVENOUS AS NEEDED
Status: DISCONTINUED | OUTPATIENT
Start: 2024-08-02 | End: 2024-08-02 | Stop reason: SURG

## 2024-08-02 RX ADMIN — SODIUM CHLORIDE 50 ML/HR: 9 INJECTION, SOLUTION INTRAVENOUS at 08:55

## 2024-08-02 RX ADMIN — PHENYLEPHRINE HYDROCHLORIDE 100 MCG: 10 INJECTION INTRAVENOUS at 10:04

## 2024-08-02 RX ADMIN — LIDOCAINE HYDROCHLORIDE 60 MG: 20 INJECTION, SOLUTION EPIDURAL; INFILTRATION; INTRACAUDAL; PERINEURAL at 10:03

## 2024-08-02 RX ADMIN — PROPOFOL INJECTABLE EMULSION 50 MG: 10 INJECTION, EMULSION INTRAVENOUS at 10:03

## 2024-08-02 RX ADMIN — PROPOFOL INJECTABLE EMULSION 100 MCG/KG/MIN: 10 INJECTION, EMULSION INTRAVENOUS at 09:58

## 2024-08-02 RX ADMIN — GLYCOPYRROLATE 0.2 MG: 0.2 INJECTION, SOLUTION INTRAMUSCULAR; INTRAVENOUS at 09:59

## 2024-08-02 NOTE — DISCHARGE INSTRUCTIONS
A responsible adult should stay with you and you should rest quietly for the rest of the day.    Do not drink alcohol, drive, operate any heavy machinery or power tools or make any legal/important decisions for the next 24 hours.     Progress your diet as tolerated.  If you begin to experience severe pain, increased shortness of breath, racing heartbeat or a fever above 101 F, seek immediate medical attention.     Follow up with MD as instructed. Call office for results in 3 to 5 days if needed.    [phone]    Impression:  Colonoscopy was normal to the cecum and terminal ileum     Recommendations:  Colonoscopy in 10 years  Follow-up with GI as needed

## 2024-08-02 NOTE — ANESTHESIA PREPROCEDURE EVALUATION
Anesthesia Evaluation     Patient summary reviewed and Nursing notes reviewed   no history of anesthetic complications:   NPO Solid Status: > 8 hours  NPO Liquid Status: > 4 hours           Airway   Mallampati: II  TM distance: >3 FB  Neck ROM: full  Dental - normal exam     Pulmonary - negative pulmonary ROS and normal exam   Cardiovascular - negative cardio ROS and normal exam  Exercise tolerance: good (4-7 METS)        Neuro/Psych- negative ROS  GI/Hepatic/Renal/Endo    (+) GERD    Musculoskeletal (-) negative ROS    Abdominal    Substance History   (+) alcohol use  (-) drug use     OB/GYN negative ob/gyn ROS         Other                      Anesthesia Plan    ASA 2     general     intravenous induction     Anesthetic plan, risks, benefits, and alternatives have been provided, discussed and informed consent has been obtained with: patient.    Plan discussed with CRNA.    CODE STATUS:

## 2024-08-02 NOTE — ANESTHESIA POSTPROCEDURE EVALUATION
Patient: Deja Ibarra    Procedure Summary       Date: 08/02/24 Room / Location: [facility] ENDOSCOPY 1 / [facility] ENDOSCOPY    Anesthesia Start: 0955 Anesthesia Stop: 1019    Procedure: COLONOSCOPY Diagnosis:       Screen for colon cancer      (Screen for colon cancer [Z12.11])    Surgeons: Brad Dykes MD Provider: Marielos Wilkinson MD    Anesthesia Type: general ASA Status: 2            Anesthesia Type: general    Vitals  Vitals Value Taken Time   /63 08/02/24 1045   Temp     Pulse 78 08/02/24 1045   Resp 14 08/02/24 1045   SpO2 99 % 08/02/24 1045           Post Anesthesia Care and Evaluation    Patient location during evaluation: PACU  Patient participation: complete - patient participated  Level of consciousness: awake and alert  Pain management: satisfactory to patient    Airway patency: patent  Anesthetic complications: No anesthetic complications  PONV Status: none  Cardiovascular status: acceptable  Respiratory status: acceptable  Hydration status: acceptable

## 2024-08-02 NOTE — OP NOTE
COLONOSCOPY Procedure Report    Patient Name:  Deja Ibarra  YOB: 1980    Date of Surgery:  8/2/2024     Preoperative diagnosis:  Screening for colon cancer    Postoperative diagnosis:  Normal colonoscopy        Procedure(s):  COLONOSCOPY     Staff:  Surgeon(s):  Brad Dykes MD      Anesthesia: Monitored Anesthesia Care    Implants:    Nothing was implanted during the procedure    Specimen:        See below    DNR status: Patient wishes full code during the procedure    Estimated blood loss: Minimal     Complications:  None     Description of Procedure:  Informed consent was obtained for the procedure, including sedation.  Risks of perforation, hemorrhage, adverse drug reaction and aspiration were discussed.  The patient was brought into the endoscopy suite. Continuous cardiopulmonary monitoring was performed.  The patient was placed in the left lateral decubitus position. After adequate sedation was attained, the digital rectal exam was performed which was normal.  Subsequently, the Olympus colonoscope was inserted into the patient's rectum and advanced to the level of the cecum and terminal ileum without difficulty.  The bowel prep was excellent.  Circumferential examination of the patient's colon was performed on scope withdrawal.  The cecum, ascending colon, and hepatic flexure were examined twice.  The transverse colon, splenic flexure, descending, sigmoid colon, and rectum were examined.  A retroflex exam was performed in the rectum.  The bowel was decompressed, the scope was withdrawn from the patient, and the patient tolerated the procedure well.     Findings:   Terminal ileum: Normal mucosa distal 10 cm  Colon: Normal mucosa to cecum.  No polyps tumors or lesions identified    Impression:  Colonoscopy was normal to the cecum and terminal ileum    Recommendations:  Colonoscopy in 10 years  Follow-up with GI as needed    We appreciate the referral    Electronically signed by  Brad Dykes MD, 08/02/24, 10:22 AM EDT.

## 2024-08-29 ENCOUNTER — LAB (OUTPATIENT)
Dept: LAB | Facility: HOSPITAL | Age: 44
End: 2024-08-29
Payer: COMMERCIAL

## 2024-08-29 ENCOUNTER — TRANSCRIBE ORDERS (OUTPATIENT)
Dept: ADMINISTRATIVE | Facility: HOSPITAL | Age: 44
End: 2024-08-29
Payer: COMMERCIAL

## 2024-08-29 DIAGNOSIS — N95.2 POSTMENOPAUSAL ATROPHIC VAGINITIS: ICD-10-CM

## 2024-08-29 DIAGNOSIS — E63.9 NUTRITIONAL DISORDER: ICD-10-CM

## 2024-08-29 DIAGNOSIS — N95.1 SYMPTOMATIC MENOPAUSAL OR FEMALE CLIMACTERIC STATES: ICD-10-CM

## 2024-08-29 DIAGNOSIS — Z84.81 FAMILY HISTORY OF GENETIC DISEASE CARRIER: ICD-10-CM

## 2024-08-29 DIAGNOSIS — R63.5 ABNORMAL WEIGHT GAIN: ICD-10-CM

## 2024-08-29 DIAGNOSIS — E56.9 MULTIPLE VITAMIN DEFICIENCY DISEASE: ICD-10-CM

## 2024-08-29 DIAGNOSIS — E63.9 NUTRITIONAL DEFICIENCY: ICD-10-CM

## 2024-08-29 DIAGNOSIS — E86.0 DEHYDRATION: ICD-10-CM

## 2024-08-29 DIAGNOSIS — R37 SEXUAL DYSFUNCTION: ICD-10-CM

## 2024-08-29 DIAGNOSIS — E34.9 ENDOCRINE DISORDER RELATED TO PUBERTY: ICD-10-CM

## 2024-08-29 DIAGNOSIS — Z13.220 SCREENING FOR LIPOID DISORDERS: ICD-10-CM

## 2024-08-29 DIAGNOSIS — G47.9 REPETITIVE INTRUSIONS OF SLEEP: ICD-10-CM

## 2024-08-29 DIAGNOSIS — R94.7 NONSPECIFIC ABNORMAL RESULTS OF ENDOCRINE FUNCTION STUDY: ICD-10-CM

## 2024-08-29 DIAGNOSIS — E55.9 AVITAMINOSIS D: ICD-10-CM

## 2024-08-29 DIAGNOSIS — Z13.6 SCREENING FOR ISCHEMIC HEART DISEASE: ICD-10-CM

## 2024-08-29 DIAGNOSIS — R23.2 FLUSHING: ICD-10-CM

## 2024-08-29 DIAGNOSIS — Z82.49 FAMILY HISTORY OF ISCHEMIC HEART DISEASE: ICD-10-CM

## 2024-08-29 DIAGNOSIS — F34.81 SEVERE MOOD DYSREGULATION DISORDER: ICD-10-CM

## 2024-08-29 DIAGNOSIS — E53.8 BIOTIN-(PROPIONYL-COA-CARBOXYLASE) LIGASE DEFICIENCY: ICD-10-CM

## 2024-08-29 DIAGNOSIS — E78.9 DISORDER OF LIPOPROTEIN AND LIPID METABOLISM: ICD-10-CM

## 2024-08-29 DIAGNOSIS — R87.1 ABNORMAL HORMONE LEVEL IN SPECIMEN FROM FEMALE GENITAL ORGAN: ICD-10-CM

## 2024-08-29 DIAGNOSIS — E01.2 ENDEMIC GOITER: ICD-10-CM

## 2024-08-29 DIAGNOSIS — R79.9 ABNORMAL BLOOD CHEMISTRY: ICD-10-CM

## 2024-08-29 DIAGNOSIS — G93.32 CHRONIC FATIGUE SYNDROME: ICD-10-CM

## 2024-08-29 DIAGNOSIS — R53.83 TIREDNESS: ICD-10-CM

## 2024-08-29 DIAGNOSIS — R68.82 DECREASED LIBIDO: ICD-10-CM

## 2024-08-29 DIAGNOSIS — F41.9 ANXIETY DISORDER OF CHILDHOOD OR ADOLESCENCE: ICD-10-CM

## 2024-08-29 DIAGNOSIS — F41.9 ANXIETY DISORDER OF CHILDHOOD OR ADOLESCENCE: Primary | ICD-10-CM

## 2024-08-29 LAB
25(OH)D3 SERPL-MCNC: 105 NG/ML (ref 30–100)
CORTIS SERPL-MCNC: 11.2 MCG/DL
ESTRADIOL SERPL HS-MCNC: 40 PG/ML
FOLATE SERPL-MCNC: 19.5 NG/ML (ref 4.78–24.2)
HCYS SERPL-MCNC: 8.3 UMOL/L (ref 0–15)
PROGEST SERPL-MCNC: 5.36 NG/ML
T3FREE SERPL-MCNC: 3.74 PG/ML (ref 2–4.4)
T4 FREE SERPL-MCNC: 1.24 NG/DL (ref 0.92–1.68)
TSH SERPL DL<=0.05 MIU/L-ACNC: 2.05 UIU/ML (ref 0.27–4.2)
VIT B12 BLD-MCNC: 877 PG/ML (ref 211–946)

## 2024-08-29 PROCEDURE — 82746 ASSAY OF FOLIC ACID SERUM: CPT

## 2024-08-29 PROCEDURE — 82306 VITAMIN D 25 HYDROXY: CPT

## 2024-08-29 PROCEDURE — 84443 ASSAY THYROID STIM HORMONE: CPT

## 2024-08-29 PROCEDURE — 84403 ASSAY OF TOTAL TESTOSTERONE: CPT

## 2024-08-29 PROCEDURE — 84402 ASSAY OF FREE TESTOSTERONE: CPT

## 2024-08-29 PROCEDURE — 84482 T3 REVERSE: CPT

## 2024-08-29 PROCEDURE — 84481 FREE ASSAY (FT-3): CPT

## 2024-08-29 PROCEDURE — 82533 TOTAL CORTISOL: CPT

## 2024-08-29 PROCEDURE — 82607 VITAMIN B-12: CPT

## 2024-08-29 PROCEDURE — 83789 MASS SPECTROMETRY QUAL/QUAN: CPT

## 2024-08-29 PROCEDURE — 36415 COLL VENOUS BLD VENIPUNCTURE: CPT

## 2024-08-29 PROCEDURE — 82670 ASSAY OF TOTAL ESTRADIOL: CPT

## 2024-08-29 PROCEDURE — 83090 ASSAY OF HOMOCYSTEINE: CPT

## 2024-08-29 PROCEDURE — 82627 DEHYDROEPIANDROSTERONE: CPT

## 2024-08-29 PROCEDURE — 84439 ASSAY OF FREE THYROXINE: CPT

## 2024-08-29 PROCEDURE — 84140 ASSAY OF PREGNENOLONE: CPT

## 2024-08-29 PROCEDURE — 84144 ASSAY OF PROGESTERONE: CPT

## 2024-08-30 LAB — DHEA-S SERPL-MCNC: 255 UG/DL (ref 41.2–243.7)

## 2024-09-03 LAB
IODINE SERPL-MCNC: 61.4 UG/L (ref 40–92)
T3REVERSE SERPL-MCNC: 20.5 NG/DL (ref 9.2–24.1)

## 2024-09-09 LAB — PREG SERPL-MCNC: 90 NG/DL

## 2024-09-11 LAB
TESTOST FREE SERPL-MCNC: 1.1 PG/ML (ref 0–4.2)
TESTOST SERPL-MCNC: 27.9 NG/DL

## 2025-03-05 NOTE — PROGRESS NOTES
Subjective   Deja Ibarra is a 45 y.o. female.       HPI   Pt is here today for routine physical exam.   Medical/social/family hx reviewed.   Immunizations reviewed.   Last pap March 2024 ([facility]); has had partial hyst and is on estradiol patch.   Mammogram April 2024  Colonoscopy Aug 2024.   Nonsmoker.   Current weight is 134; BMI 23.      Running several times weekly; strength exercise 4 days a week; 1200 calories daily.      The following portions of the patient's history were reviewed and updated as appropriate: allergies, current medications, past family history, past medical history, past social history, past surgical history, and problem list.    Review of Systems   Constitutional:  Negative for chills, fatigue and fever.   Eyes:  Negative for visual disturbance.   Respiratory:  Negative for cough, shortness of breath and wheezing.    Cardiovascular:  Negative for chest pain and palpitations.   Gastrointestinal:  Negative for abdominal pain, blood in stool, constipation, diarrhea, nausea, vomiting and indigestion.   Genitourinary:  Negative for dysuria, frequency, hematuria and urgency.   Neurological:  Negative for dizziness, weakness, light-headedness and headache.   Psychiatric/Behavioral:  Negative for depressed mood. The patient is not nervous/anxious.        Objective   Physical Exam  Vitals reviewed.   Constitutional:       General: She is not in acute distress.     Appearance: Normal appearance.   HENT:      Head: Normocephalic and atraumatic.   Cardiovascular:      Rate and Rhythm: Normal rate and regular rhythm.      Pulses: Normal pulses.      Heart sounds: Normal heart sounds. No murmur heard.  Pulmonary:      Effort: Pulmonary effort is normal. No respiratory distress.      Breath sounds: Normal breath sounds. No wheezing, rhonchi or rales.   Chest:      Chest wall: No tenderness.   Abdominal:      General: Bowel sounds are normal. There is no distension.      Palpations: Abdomen is soft.       Tenderness: There is no abdominal tenderness. There is no right CVA tenderness or left CVA tenderness.   Musculoskeletal:      Cervical back: Normal range of motion.   Skin:     General: Skin is warm and dry.   Neurological:      General: No focal deficit present.      Mental Status: She is alert and oriented to person, place, and time.   Psychiatric:         Mood and Affect: Mood normal.         BMI is within normal parameters. No other follow-up for BMI required.       Procedures   Assessment & Plan   Diagnoses and all orders for this visit:    1. Health maintenance examination (Primary)  Comments:  Medical/social/family hx reviewed.   Labs ordered.   Pap UTD.   Mammogram ordered.   Colonoscopy UTD.  Orders:  -     Comprehensive metabolic panel; Future  -     Lipid panel; Future    2. Other screening mammogram  Comments:  Order given for screening mammogram  Orders:  -     Mammo Screening Digital Tomosynthesis Bilateral With CAD; Future

## 2025-03-07 ENCOUNTER — LAB (OUTPATIENT)
Dept: FAMILY MEDICINE CLINIC | Facility: CLINIC | Age: 45
End: 2025-03-07
Payer: COMMERCIAL

## 2025-03-07 ENCOUNTER — OFFICE VISIT (OUTPATIENT)
Dept: FAMILY MEDICINE CLINIC | Facility: CLINIC | Age: 45
End: 2025-03-07
Payer: COMMERCIAL

## 2025-03-07 VITALS
HEART RATE: 66 BPM | WEIGHT: 134 LBS | BODY MASS INDEX: 22.88 KG/M2 | OXYGEN SATURATION: 98 % | SYSTOLIC BLOOD PRESSURE: 97 MMHG | HEIGHT: 64 IN | DIASTOLIC BLOOD PRESSURE: 68 MMHG

## 2025-03-07 DIAGNOSIS — Z12.31 OTHER SCREENING MAMMOGRAM: ICD-10-CM

## 2025-03-07 DIAGNOSIS — Z00.00 HEALTH MAINTENANCE EXAMINATION: ICD-10-CM

## 2025-03-07 DIAGNOSIS — Z00.00 HEALTH MAINTENANCE EXAMINATION: Primary | ICD-10-CM

## 2025-03-07 LAB
ALBUMIN SERPL-MCNC: 4.5 G/DL (ref 3.5–5.2)
ALBUMIN/GLOB SERPL: 2 G/DL
ALP SERPL-CCNC: 67 U/L (ref 39–117)
ALT SERPL W P-5'-P-CCNC: 12 U/L (ref 1–33)
ANION GAP SERPL CALCULATED.3IONS-SCNC: 11 MMOL/L (ref 5–15)
AST SERPL-CCNC: 21 U/L (ref 1–32)
BILIRUB SERPL-MCNC: 0.9 MG/DL (ref 0–1.2)
BUN SERPL-MCNC: 9 MG/DL (ref 6–20)
BUN/CREAT SERPL: 11.4 (ref 7–25)
CALCIUM SPEC-SCNC: 9.1 MG/DL (ref 8.6–10.5)
CHLORIDE SERPL-SCNC: 106 MMOL/L (ref 98–107)
CHOLEST SERPL-MCNC: 186 MG/DL (ref 0–200)
CO2 SERPL-SCNC: 22 MMOL/L (ref 22–29)
CREAT SERPL-MCNC: 0.79 MG/DL (ref 0.57–1)
EGFRCR SERPLBLD CKD-EPI 2021: 93.6 ML/MIN/1.73
GLOBULIN UR ELPH-MCNC: 2.3 GM/DL
GLUCOSE SERPL-MCNC: 82 MG/DL (ref 65–99)
HDLC SERPL-MCNC: 54 MG/DL (ref 40–60)
LDLC SERPL CALC-MCNC: 117 MG/DL (ref 0–100)
LDLC/HDLC SERPL: 2.14 {RATIO}
POTASSIUM SERPL-SCNC: 4.1 MMOL/L (ref 3.5–5.2)
PROT SERPL-MCNC: 6.8 G/DL (ref 6–8.5)
SODIUM SERPL-SCNC: 139 MMOL/L (ref 136–145)
TRIGL SERPL-MCNC: 83 MG/DL (ref 0–150)
VLDLC SERPL-MCNC: 15 MG/DL (ref 5–40)

## 2025-03-07 PROCEDURE — 36415 COLL VENOUS BLD VENIPUNCTURE: CPT

## 2025-03-07 PROCEDURE — 80061 LIPID PANEL: CPT | Performed by: NURSE PRACTITIONER

## 2025-03-07 PROCEDURE — 80053 COMPREHEN METABOLIC PANEL: CPT | Performed by: NURSE PRACTITIONER

## 2025-04-02 LAB
NCCN CRITERIA FLAG: NORMAL
TYRER CUZICK SCORE: 9.3

## 2025-04-17 ENCOUNTER — HOSPITAL ENCOUNTER (OUTPATIENT)
Dept: MAMMOGRAPHY | Facility: HOSPITAL | Age: 45
Discharge: HOME | End: 2025-04-17
Admitting: NURSE PRACTITIONER
Payer: COMMERCIAL

## 2025-04-17 DIAGNOSIS — Z12.31 OTHER SCREENING MAMMOGRAM: ICD-10-CM

## 2025-04-17 PROCEDURE — 77067 SCR MAMMO BI INCL CAD: CPT

## 2025-04-17 PROCEDURE — 77063 BREAST TOMOSYNTHESIS BI: CPT

## (undated) DEVICE — PK ENDO GI 50